# Patient Record
Sex: MALE | Race: WHITE | Employment: FULL TIME | URBAN - METROPOLITAN AREA
[De-identification: names, ages, dates, MRNs, and addresses within clinical notes are randomized per-mention and may not be internally consistent; named-entity substitution may affect disease eponyms.]

---

## 2021-12-16 ENCOUNTER — OFFICE VISIT (OUTPATIENT)
Dept: ORTHOPEDIC SURGERY | Age: 61
End: 2021-12-16
Payer: COMMERCIAL

## 2021-12-16 VITALS — HEIGHT: 73 IN | BODY MASS INDEX: 29.16 KG/M2 | WEIGHT: 220 LBS

## 2021-12-16 DIAGNOSIS — S89.92XA LEFT LEG INJURY, INITIAL ENCOUNTER: Primary | ICD-10-CM

## 2021-12-16 DIAGNOSIS — S80.12XA CONTUSION OF LEFT LEG, INITIAL ENCOUNTER: ICD-10-CM

## 2021-12-16 PROCEDURE — 99202 OFFICE O/P NEW SF 15 MIN: CPT | Performed by: ORTHOPAEDIC SURGERY

## 2021-12-16 RX ORDER — CEPHALEXIN 250 MG/1
CAPSULE ORAL
COMMUNITY
Start: 2021-12-14 | End: 2022-07-29 | Stop reason: ALTCHOICE

## 2021-12-16 RX ORDER — SULFAMETHOXAZOLE AND TRIMETHOPRIM 800; 160 MG/1; MG/1
TABLET ORAL
COMMUNITY
Start: 2021-12-14 | End: 2022-07-29 | Stop reason: ALTCHOICE

## 2022-07-29 ENCOUNTER — OFFICE VISIT (OUTPATIENT)
Dept: INTERNAL MEDICINE CLINIC | Age: 62
End: 2022-07-29
Payer: COMMERCIAL

## 2022-07-29 VITALS
TEMPERATURE: 97.8 F | RESPIRATION RATE: 16 BRPM | HEIGHT: 73 IN | OXYGEN SATURATION: 98 % | HEART RATE: 68 BPM | WEIGHT: 225 LBS | DIASTOLIC BLOOD PRESSURE: 80 MMHG | SYSTOLIC BLOOD PRESSURE: 136 MMHG | BODY MASS INDEX: 29.82 KG/M2

## 2022-07-29 DIAGNOSIS — Z11.59 NEED FOR HEPATITIS C SCREENING TEST: ICD-10-CM

## 2022-07-29 DIAGNOSIS — R00.2 PALPITATIONS: ICD-10-CM

## 2022-07-29 DIAGNOSIS — Z00.00 WELL ADULT EXAM: Primary | ICD-10-CM

## 2022-07-29 DIAGNOSIS — H91.93 BILATERAL HEARING LOSS, UNSPECIFIED HEARING LOSS TYPE: ICD-10-CM

## 2022-07-29 PROCEDURE — 99386 PREV VISIT NEW AGE 40-64: CPT | Performed by: INTERNAL MEDICINE

## 2022-07-29 NOTE — PROGRESS NOTES
ADVISED PATIENT OF THE FOLLOWING HEALTH MAINTAINCE DUE  Health Maintenance Due   Topic Date Due    Hepatitis C Screening  Never done    COVID-19 Vaccine (1) Never done    Lipid Screen  Never done    Colorectal Cancer Screening Combo  Never done    Shingrix Vaccine Age 50> (1 of 2) Never done      Chief Complaint   Patient presents with    Establish Care       1. Have you been to the ER, urgent care clinic since your last visit? Hospitalized since your last visit? No    2. Have you seen or consulted any other health care providers outside of the [address] [place] [address] since your last visit? Include any DEXA scan, mammography  or colon screening. No    3. Do you have an Advance Directive on file? no    4. Do you have a DNR on file? no    Patient is accompanied by self I have received verbal consent from Joanna Snell to discuss any/all medical information while they are present in the room. No flowsheet data found.

## 2022-07-29 NOTE — PROGRESS NOTES
HISTORY OF PRESENT ILLNESS  Conner St is a 60 y.o. male. New patient who comes in to establish care and have a physical.  I reviewed records in EMR. Vital signs are stable. Has gained some weight. BMI 29.7. No significant PMH. Reports having worsening hearing loss. Also reports occasional palpitations but no chest pain, dyspnea, heart related symptoms. No history of heart disease. NKA. No prescription medications. Denies smoking. Drinks socially. 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 He is . Reports trying to watch his diet and be active physically. No issues with COVID. Needs labs. No other acute complaints. HPI    Review of Systems   Constitutional: Negative. HENT:  Positive for hearing loss. Negative for ear discharge, ear pain and tinnitus. Eyes: Negative. Negative for blurred vision. Respiratory: Negative. Negative for shortness of breath. Cardiovascular:  Positive for palpitations. Negative for chest pain and leg swelling. Gastrointestinal: Negative. Negative for abdominal pain and heartburn. Genitourinary: Negative. Negative for dysuria. Musculoskeletal:  Positive for joint pain. Negative for falls. Skin: Negative. Neurological: Negative. Negative for dizziness, sensory change, focal weakness and headaches. Endo/Heme/Allergies: Negative. Negative for polydipsia. Psychiatric/Behavioral: Negative. Negative for depression. The patient is not nervous/anxious and does not have insomnia. Physical Exam  Vitals and nursing note reviewed. Constitutional:       General: He is not in acute distress. Appearance: He is well-developed. Comments: Pleasant overweight man   HENT:      Head: Normocephalic and atraumatic. Right Ear: Tympanic membrane normal.      Left Ear: Tympanic membrane normal.      Nose: Nose normal.      Mouth/Throat:      Mouth: Mucous membranes are moist.      Pharynx: Oropharynx is clear.    Eyes:      General: No scleral icterus. Conjunctiva/sclera: Conjunctivae normal.      Pupils: Pupils are equal, round, and reactive to light. Neck:      Thyroid: No thyromegaly. Vascular: No carotid bruit or JVD. Cardiovascular:      Rate and Rhythm: Normal rate and regular rhythm. Heart sounds: Normal heart sounds. No murmur heard. Pulmonary:      Effort: Pulmonary effort is normal. No respiratory distress. Breath sounds: Normal breath sounds. No wheezing or rales. Abdominal:      General: Bowel sounds are normal. There is no distension. Palpations: Abdomen is soft. Tenderness: There is no abdominal tenderness. There is no right CVA tenderness or left CVA tenderness. Musculoskeletal:         General: No tenderness. Cervical back: Normal range of motion and neck supple. Right lower leg: No edema. Left lower leg: No edema. Lymphadenopathy:      Cervical: No cervical adenopathy. Skin:     General: Skin is warm and dry. Findings: No erythema or rash. Neurological:      General: No focal deficit present. Mental Status: He is alert and oriented to person, place, and time. Cranial Nerves: No cranial nerve deficit. Coordination: Coordination normal.   Psychiatric:         Behavior: Behavior normal.     ASSESSMENT and PLAN  Diagnoses and all orders for this visit:    1. Well adult exam  -     LIPID PANEL; Future  -     METABOLIC PANEL, COMPREHENSIVE; Future  -     CBC W/O DIFF; Future  -     HEMOGLOBIN A1C WITH EAG; Future  -     TSH 3RD GENERATION; Future  -     VITAMIN D, 25 HYDROXY; Future  -     PSA SCREENING (SCREENING); Future    2. Bilateral hearing loss, unspecified hearing loss type  -     REFERRAL TO ENT-OTOLARYNGOLOGY    3. Need for hepatitis C screening test  -     HEPATITIS C AB; Future    4. Palpitations  -     REFERRAL TO CARDIOLOGY      Follow-up and Dispositions    Return in about 1 year (around 7/29/2023).      All chronic medical problems are stable  Continue with current medical management and plan  lab results and schedule of future lab studies reviewed with patient  reviewed diet, exercise and weight control  reviewed medications and side effects in detail  F/u with other MD's/ providers as scheduled  COVID-19 precautions discussed with pt  An After Visit Summary was printed and given to the patient.

## 2022-08-02 LAB
25(OH)D3+25(OH)D2 SERPL-MCNC: 30.5 NG/ML (ref 30–100)
ALBUMIN SERPL-MCNC: 4.4 G/DL (ref 3.8–4.8)
ALBUMIN/GLOB SERPL: 1.8 {RATIO} (ref 1.2–2.2)
ALP SERPL-CCNC: 69 IU/L (ref 44–121)
ALT SERPL-CCNC: 25 IU/L (ref 0–44)
AST SERPL-CCNC: 21 IU/L (ref 0–40)
BILIRUB SERPL-MCNC: 0.4 MG/DL (ref 0–1.2)
BUN SERPL-MCNC: 15 MG/DL (ref 8–27)
BUN/CREAT SERPL: 17 (ref 10–24)
CALCIUM SERPL-MCNC: 9.4 MG/DL (ref 8.6–10.2)
CHLORIDE SERPL-SCNC: 102 MMOL/L (ref 96–106)
CHOLEST SERPL-MCNC: 211 MG/DL (ref 100–199)
CO2 SERPL-SCNC: 23 MMOL/L (ref 20–29)
CREAT SERPL-MCNC: 0.86 MG/DL (ref 0.76–1.27)
EGFR: 97 ML/MIN/1.73
ERYTHROCYTE [DISTWIDTH] IN BLOOD BY AUTOMATED COUNT: 12.7 % (ref 11.6–15.4)
EST. AVERAGE GLUCOSE BLD GHB EST-MCNC: 100 MG/DL
GLOBULIN SER CALC-MCNC: 2.5 G/DL (ref 1.5–4.5)
GLUCOSE SERPL-MCNC: 95 MG/DL (ref 65–99)
HBA1C MFR BLD: 5.1 % (ref 4.8–5.6)
HCT VFR BLD AUTO: 47.6 % (ref 37.5–51)
HCV AB S/CO SERPL IA: <0.1 S/CO RATIO (ref 0–0.9)
HDLC SERPL-MCNC: 33 MG/DL
HGB BLD-MCNC: 16.2 G/DL (ref 13–17.7)
IMP & REVIEW OF LAB RESULTS: NORMAL
LDLC SERPL CALC-MCNC: 126 MG/DL (ref 0–99)
MCH RBC QN AUTO: 30.8 PG (ref 26.6–33)
MCHC RBC AUTO-ENTMCNC: 34 G/DL (ref 31.5–35.7)
MCV RBC AUTO: 91 FL (ref 79–97)
PLATELET # BLD AUTO: 219 X10E3/UL (ref 150–450)
POTASSIUM SERPL-SCNC: 4.1 MMOL/L (ref 3.5–5.2)
PROT SERPL-MCNC: 6.9 G/DL (ref 6–8.5)
PSA SERPL-MCNC: 3.6 NG/ML (ref 0–4)
RBC # BLD AUTO: 5.26 X10E6/UL (ref 4.14–5.8)
SODIUM SERPL-SCNC: 139 MMOL/L (ref 134–144)
TRIGL SERPL-MCNC: 292 MG/DL (ref 0–149)
TSH SERPL DL<=0.005 MIU/L-ACNC: 2.67 UIU/ML (ref 0.45–4.5)
VLDLC SERPL CALC-MCNC: 52 MG/DL (ref 5–40)
WBC # BLD AUTO: 9.7 X10E3/UL (ref 3.4–10.8)

## 2022-08-05 NOTE — PROGRESS NOTES
Cholesterol is elevated. Recommend that patient watch diet for fatty foods as well as those high in sugar and exercise regularly as tolerated.     Otherwise, stable labs

## 2022-08-28 PROBLEM — Z00.00 WELL ADULT EXAM: Status: RESOLVED | Noted: 2022-07-29 | Resolved: 2022-08-28

## 2022-09-07 ENCOUNTER — OFFICE VISIT (OUTPATIENT)
Dept: CARDIOLOGY CLINIC | Age: 62
End: 2022-09-07
Payer: COMMERCIAL

## 2022-09-07 ENCOUNTER — TELEPHONE (OUTPATIENT)
Dept: CARDIOLOGY CLINIC | Age: 62
End: 2022-09-07

## 2022-09-07 VITALS
WEIGHT: 215 LBS | BODY MASS INDEX: 28.49 KG/M2 | HEART RATE: 61 BPM | OXYGEN SATURATION: 98 % | DIASTOLIC BLOOD PRESSURE: 62 MMHG | RESPIRATION RATE: 16 BRPM | SYSTOLIC BLOOD PRESSURE: 100 MMHG | HEIGHT: 73 IN

## 2022-09-07 DIAGNOSIS — R00.2 PALPITATIONS: Primary | ICD-10-CM

## 2022-09-07 DIAGNOSIS — R00.0 TACHYCARDIA: ICD-10-CM

## 2022-09-07 PROCEDURE — 93000 ELECTROCARDIOGRAM COMPLETE: CPT | Performed by: SPECIALIST

## 2022-09-07 PROCEDURE — 99204 OFFICE O/P NEW MOD 45 MIN: CPT | Performed by: SPECIALIST

## 2022-09-07 NOTE — PATIENT INSTRUCTIONS
You have been scheduled for an echocardiogram.     A 30 day loop monitor has been ordered for you. This will be mailed to the address given to us in the next 5-7 business days. Please read over the instructions given to you about [employer] loop monitors. If you have any insurance questions regarding coverage and out of pocket cost please contact the number below. If you have any billing questions regarding deductible or responsibility call [phone]. If you need additional supplies or issue with your monitor please call [phone]. We will see you back after testing.

## 2022-09-07 NOTE — TELEPHONE ENCOUNTER
Enrolled with [employer] - Ordered and being shipped to patient's home address on file. ETA within 5-7 business days. Received: Today  MAMADOU Rosen  Can you order this young man a 27 day loop for tachy/palps?      Thanks

## 2022-09-07 NOTE — PROGRESS NOTES
Charmaine Tariq     1960       Celestine Aponte MD, [facility]  Date of Visit-09/07/2022   PCP is DO [facility] [place]  [facility]  HPI:  Charmaine Tariq is a 60 y.o. male   Claretha Mcardle comes to see us today on referral from Angelito Coronel DO for palpitations and tachycardia. About 25 years ago he would occasionally notice that he would feel a sense of rapid irregular heartbeat. It initially happened when he was in the sun and overheated. Over the last several years it slowly increased and then in the last few weeks is definitely increasing frequency almost every day . Now it has sudden onset and makes him feel a cold feeling in the throat and upper sternum. It is associated with rapid and strong heartbeats. It is now occurring more frequently at night and evenings. It became more severe about 4 weeks ago and now is more frequent occurring almost every day. He does not feel shortness of breath or syncopal with this. In general he has no chest pain or edema. He goes to the gym at [employer] 3-4 times a week for 45 minutes of cardio and 30 minutes of lifting and does not feel any limitation. He has no family history of arrhythmia or CAD other than a maternal grandmother who passed from a heart attack a long time ago. He feels no general limitation. His review of systems except for the palpitations and tachycardia is negative. He is on no medications. He has reduced alcohol , drinks 1 to 2 cups of caffeine daily , and has been exercising and losing weight intentionally. EKG:   normal EKG, normal sinus rhythm within normal limits   Assessment/Plan:   Patient Instructions   You have been scheduled for an echocardiogram.     A 30 day loop monitor has been ordered for you. This will be mailed to the address given to us in the next 5-7 business days. Please read over the instructions given to you about [employer] loop monitors. If you have any insurance questions regarding coverage and out of pocket cost please contact the number below. If you have any billing questions regarding deductible or responsibility call [phone]. If you need additional supplies or issue with your monitor please call [phone]. We will see you back after testing. Patient with tachycardia and a pounding sensation with associated some coolness in the throat area that is occurring more frequently. We went over the differential which could include ectopics ,PACs ,PVCs ,accelerated sinus tachycardias ,SVTs, and atrial fibrillation. The latter would be the most significant in terms of changing plans and therapy. Our typical work-up here would be an echocardiogram and a 30 Day Loop monitor and I went over the rationale for the testing and with the expected findings could be. He is comfortable  with plans and we both agree at this time to not start medication. He is not limited in ADLs. If we see a significant tachycardia we could consider a trial of beta-blockers. I will see him back in 60 days after the echo and 30 Day Loop are back. His prior labs were reviewed and his TSH and chemistry was normal along with his blood count. He does have a slight elevation in his cholesterol and LDL. His risk score is moderate at 9.8% I would suggest that we consider a CT calcium score but would wait until we have more data on the echo in case that would change plans --we will discuss the calcium score with him when he returns. Thank you for the consultation very nice gentleman hopefully we can get him some answers      Impression:    1. Palpitations    2.  Tachycardia       The 10-year ASCVD risk score (Sierra Byrne., et al., 2013) is: 9.8%    Values used to calculate the score:      Age: 60 years      Sex: Male      Is Non- : No      Diabetic: No      Tobacco smoker: No      Systolic Blood Pressure: 390 mmHg      Is BP treated: No      HDL Cholesterol: 33 mg/dL      Total Cholesterol: 211 mg/dL   Future Appointments   Date Time Provider Lori Saldaña   10/11/2022  8:00 AM PATRIZIA ABURTO AMB   10/20/2022 10:20 AM MD CASEY Cutler  MARTIN      Patient Care Team:  Ashwin Booker DO as PCP - General (Internal Medicine Physician)  Ashwin Booker DO as PCP - 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 Review of Systems   Constitutional:  Negative for diaphoresis, fever and malaise/fatigue. HENT:  Negative for ear pain, hearing loss, nosebleeds and tinnitus. Eyes:  Negative for blurred vision, double vision and pain. Respiratory:  Negative for cough, hemoptysis, sputum production, shortness of breath, wheezing and stridor. Cardiovascular:  Positive for palpitations. Negative for chest pain, orthopnea, claudication and leg swelling. Gastrointestinal:  Negative for abdominal pain, blood in stool, constipation, diarrhea, heartburn, nausea and vomiting. Genitourinary:  Negative for dysuria, frequency and urgency. Musculoskeletal:  Negative for back pain, falls and joint pain. Skin:  Negative for rash. Neurological:  Negative for dizziness, seizures, loss of consciousness, weakness and headaches. Endo/Heme/Allergies:  Does not bruise/bleed easily. Psychiatric/Behavioral:  Negative for depression, hallucinations and memory loss. The patient is not nervous/anxious and does not have insomnia.        Exam and Labs:  Visit Vitals  /62   Pulse 61   Resp 16   Ht 6' 1\" (1.854 m)   Wt 215 lb (97.5 kg)   SpO2 98%   BMI 28.37 kg/m²      Constitutional:  NAD, comfortable ,   Head: NC,AT. Eyes: No scleral icterus. Neck:  Neck supple. No JVD present. Throat: moist mucous membranes. Chest: Effort normal & normal respiratory excursion . Neurological: alert, conversant and oriented . Skin: Skin is not cold. No obvious systemic rash noted. Not diaphoretic. No erythema. Psychiatric:  Grossly normal mood and affect. Behavior appears normal. Extremities:  no clubbing or cyanosis. Abdomen: non distended   Lungs:breath sounds normal. No stridor. distress, wheezes or  Rales. Heart:normal rate, regular rhythm, normal S1, S2, no murmurs, rubs, clicks or gallops , PMI non displaced. Edema: Edema is none. Additional neck no bruit  Additional abdomen nontender to palpation no aortic or renal bruits no palpable mass  Lab Results   Component Value Date/Time    Cholesterol, total 211 (H) 08/01/2022 10:34 AM    HDL Cholesterol 33 (L) 08/01/2022 10:34 AM    LDL, calculated 126 (H) 08/01/2022 10:34 AM    Triglyceride 292 (H) 08/01/2022 10:34 AM     Lab Results   Component Value Date/Time    TSH 2.670 08/01/2022 10:34 AM      No results found for this or any previous visit. Lab Results   Component Value Date/Time    Sodium 139 08/01/2022 10:34 AM    Potassium 4.1 08/01/2022 10:34 AM    Chloride 102 08/01/2022 10:34 AM    CO2 23 08/01/2022 10:34 AM    Glucose 95 08/01/2022 10:34 AM    BUN 15 08/01/2022 10:34 AM    Creatinine 0.86 08/01/2022 10:34 AM    BUN/Creatinine ratio 17 08/01/2022 10:34 AM    Calcium 9.4 08/01/2022 10:34 AM      Wt Readings from Last 3 Encounters:   09/07/22 215 lb (97.5 kg)   07/29/22 225 lb (102.1 kg)   12/16/21 220 lb (99.8 kg)      BP Readings from Last 3 Encounters:   09/07/22 100/62   07/29/22 136/80        No current outpatient medications on file.      No current facility-administered medications for this visit. Impression see above.

## 2022-10-11 ENCOUNTER — ANCILLARY PROCEDURE (OUTPATIENT)
Dept: CARDIOLOGY CLINIC | Age: 62
End: 2022-10-11
Payer: COMMERCIAL

## 2022-10-11 VITALS — WEIGHT: 215 LBS | BODY MASS INDEX: 28.49 KG/M2 | HEIGHT: 73 IN

## 2022-10-11 DIAGNOSIS — R00.2 HEART PALPITATIONS: ICD-10-CM

## 2022-10-11 PROCEDURE — 93306 TTE W/DOPPLER COMPLETE: CPT | Performed by: SPECIALIST

## 2022-10-13 LAB
ECHO AO ASC DIAM: 3.2 CM
ECHO AO ASCENDING AORTA INDEX: 1.44 CM/M2
ECHO AO ROOT DIAM: 3.6 CM
ECHO AO ROOT INDEX: 1.62 CM/M2
ECHO AV AREA PEAK VELOCITY: 2.6 CM2
ECHO AV AREA VTI: 2.5 CM2
ECHO AV AREA/BSA PEAK VELOCITY: 1.2 CM2/M2
ECHO AV AREA/BSA VTI: 1.1 CM2/M2
ECHO AV MEAN GRADIENT: 3 MMHG
ECHO AV MEAN VELOCITY: 0.9 M/S
ECHO AV PEAK GRADIENT: 7 MMHG
ECHO AV PEAK VELOCITY: 1.3 M/S
ECHO AV VELOCITY RATIO: 0.77
ECHO AV VTI: 27.2 CM
ECHO LA DIAMETER INDEX: 1.53 CM/M2
ECHO LA DIAMETER: 3.4 CM
ECHO LA TO AORTIC ROOT RATIO: 0.94
ECHO LA VOL 2C: 88 ML (ref 18–58)
ECHO LA VOL 4C: 46 ML (ref 18–58)
ECHO LA VOL BP: 64 ML (ref 18–58)
ECHO LA VOL/BSA BIPLANE: 29 ML/M2 (ref 16–34)
ECHO LA VOLUME AREA LENGTH: 68 ML
ECHO LA VOLUME INDEX A2C: 40 ML/M2 (ref 16–34)
ECHO LA VOLUME INDEX A4C: 21 ML/M2 (ref 16–34)
ECHO LA VOLUME INDEX AREA LENGTH: 31 ML/M2 (ref 16–34)
ECHO LV E' LATERAL VELOCITY: 12 CM/S
ECHO LV E' SEPTAL VELOCITY: 10 CM/S
ECHO LV EDV A2C: 123 ML
ECHO LV EDV A4C: 119 ML
ECHO LV EDV BP: 124 ML (ref 67–155)
ECHO LV EDV INDEX A4C: 54 ML/M2
ECHO LV EDV INDEX BP: 56 ML/M2
ECHO LV EDV NDEX A2C: 55 ML/M2
ECHO LV EJECTION FRACTION A2C: 59 %
ECHO LV EJECTION FRACTION A4C: 47 %
ECHO LV EJECTION FRACTION BIPLANE: 53 % (ref 55–100)
ECHO LV ESV A2C: 51 ML
ECHO LV ESV A4C: 63 ML
ECHO LV ESV BP: 59 ML (ref 22–58)
ECHO LV ESV INDEX A2C: 23 ML/M2
ECHO LV ESV INDEX A4C: 28 ML/M2
ECHO LV ESV INDEX BP: 27 ML/M2
ECHO LV FRACTIONAL SHORTENING: 32 % (ref 28–44)
ECHO LV INTERNAL DIMENSION DIASTOLE INDEX: 2.12 CM/M2
ECHO LV INTERNAL DIMENSION DIASTOLIC: 4.7 CM (ref 4.2–5.9)
ECHO LV INTERNAL DIMENSION SYSTOLIC INDEX: 1.44 CM/M2
ECHO LV INTERNAL DIMENSION SYSTOLIC: 3.2 CM
ECHO LV IVSD: 1.3 CM (ref 0.6–1)
ECHO LV MASS 2D: 251.4 G (ref 88–224)
ECHO LV MASS INDEX 2D: 113.2 G/M2 (ref 49–115)
ECHO LV POSTERIOR WALL DIASTOLIC: 1.4 CM (ref 0.6–1)
ECHO LV RELATIVE WALL THICKNESS RATIO: 0.6
ECHO LVOT AREA: 3.1 CM2
ECHO LVOT AV VTI INDEX: 0.76
ECHO LVOT DIAM: 2 CM
ECHO LVOT MEAN GRADIENT: 2 MMHG
ECHO LVOT PEAK GRADIENT: 4 MMHG
ECHO LVOT PEAK VELOCITY: 1 M/S
ECHO LVOT STROKE VOLUME INDEX: 29.1 ML/M2
ECHO LVOT SV: 64.7 ML
ECHO LVOT VTI: 20.6 CM
ECHO MV A VELOCITY: 0.64 M/S
ECHO MV E DECELERATION TIME (DT): 275.8 MS
ECHO MV E VELOCITY: 0.74 M/S
ECHO MV E/A RATIO: 1.16
ECHO MV E/E' LATERAL: 6.17
ECHO MV E/E' RATIO (AVERAGED): 6.78
ECHO MV E/E' SEPTAL: 7.4
ECHO PV MAX VELOCITY: 0.8 M/S
ECHO PV PEAK GRADIENT: 2 MMHG
ECHO RA MINOR AXIS INDEX: 1.98 CM/M2
ECHO RA MINOR AXIS: 4.4 CM
ECHO RV INTERNAL DIMENSION: 4.4 CM
ECHO RV TAPSE: 2.3 CM (ref 1.7–?)

## 2022-10-21 ENCOUNTER — OFFICE VISIT (OUTPATIENT)
Dept: CARDIOLOGY CLINIC | Age: 62
End: 2022-10-21
Payer: COMMERCIAL

## 2022-10-21 VITALS
HEIGHT: 73 IN | HEART RATE: 60 BPM | RESPIRATION RATE: 18 BRPM | WEIGHT: 206 LBS | BODY MASS INDEX: 27.3 KG/M2 | OXYGEN SATURATION: 98 % | SYSTOLIC BLOOD PRESSURE: 120 MMHG | DIASTOLIC BLOOD PRESSURE: 60 MMHG

## 2022-10-21 DIAGNOSIS — R00.0 TACHYCARDIA: ICD-10-CM

## 2022-10-21 DIAGNOSIS — I51.7 LVH (LEFT VENTRICULAR HYPERTROPHY): ICD-10-CM

## 2022-10-21 DIAGNOSIS — R00.2 PALPITATIONS: Primary | ICD-10-CM

## 2022-10-21 PROCEDURE — 99214 OFFICE O/P EST MOD 30 MIN: CPT | Performed by: SPECIALIST

## 2022-10-21 NOTE — PROGRESS NOTES
HISTORY OF PRESENT ILLNESS  Lillie Gleason is a 60 y.o. male     SUMMARY:   Problem List  Date Reviewed: 10/20/2022            Codes Class Noted    Bilateral hearing loss, unspecified hearing loss type ICD-10-CM: H91.93  ICD-9-CM: 389.9  7/29/2022        Palpitations ICD-10-CM: R00.2  ICD-9-CM: 785.1  7/29/2022           No current outpatient medications on file prior to visit. No current facility-administered medications on file prior to visit. CARDIOLOGY STUDIES TO DATE:  9/22 30d event monitor, occ symptomatic pacs  10/22 mild lvh, otherwise normal echo      Chief Complaint   Patient presents with    Follow-up     HPI :  He presented with symptomatic palpitations and perhaps tachycardia and was evaluated for this last month. He had an echo and event monitor which I reviewed and summarized above. Turns out couple of the worst episodes he had he was not wearing the event monitor. He continues to exercise regularly without any worrisome symptoms. CARDIAC ROS:   negative for chest pain, dyspnea, palpitations, syncope, orthopnea, paroxysmal nocturnal dyspnea, exertional chest pressure/discomfort, claudication, lower extremity edema    Family History   Problem Relation Age of Onset    Heart Disease Maternal Grandmother        Past Medical History:   Diagnosis Date    Hearing loss        GENERAL ROS:  A comprehensive review of systems was negative except for that written in the HPI.     Visit Vitals  /60 (BP 1 Location: Right arm, BP Patient Position: Sitting, BP Cuff Size: Adult)   Pulse 60   Resp 18   Ht 6' 1\" (1.854 m)   Wt 206 lb (93.4 kg)   SpO2 98%   BMI 27.18 kg/m²       Wt Readings from Last 3 Encounters:   10/21/22 206 lb (93.4 kg)   10/11/22 215 lb (97.5 kg)   09/07/22 215 lb (97.5 kg)            BP Readings from Last 3 Encounters:   10/21/22 120/60   09/07/22 100/62   07/29/22 136/80       PHYSICAL EXAM  General appearance: alert, cooperative, no distress, appears stated age  Neurologic: Alert and oriented X 3  Neck: supple, symmetrical, trachea midline, no adenopathy, no carotid bruit, and no JVD  Lungs: clear to auscultation bilaterally  Heart: regular rate and rhythm, S1, S2 normal, no murmur, click, rub or gallop  Extremities: extremities normal, atraumatic, no cyanosis or edema    Lab Results   Component Value Date/Time    Cholesterol, total 211 (H) 08/01/2022 10:34 AM    HDL Cholesterol 33 (L) 08/01/2022 10:34 AM    LDL, calculated 126 (H) 08/01/2022 10:34 AM    Triglyceride 292 (H) 08/01/2022 10:34 AM     ASSESSMENT :      I explained to him that at least based on what we captured while he was wearing the monitor there was nothing serious or worrisome there. I advised he get a hand-held device which she can keep with him and use to see if he is having anything more significant going forward. He is concerned about his left ventricular hypertrophy and with a normal EKG I think it is unlikely he has a hypertrophic cardiomyopathy but I told him we could consider doing a cardiac MRI to further evaluate. I also recommended he get a calcium score to further risk stratify things and he is going to consider that. Finally we talked about symptoms that would prompt an urgent return visit here or a call to 911. current treatment plan is effective, no change in therapy  lab results and schedule of future lab studies reviewed with patient  reviewed diet, exercise and weight control    Encounter Diagnoses   Name Primary? Palpitations Yes    Tachycardia     LVH (left ventricular hypertrophy)      No orders of the defined types were placed in this encounter. Follow-up and Dispositions    Return in about 6 months (around 4/21/2023). Luis Ireland MD  10/21/2022  Please note that this dictation was completed with [employer], the 360T voice recognition software.   Quite often unanticipated grammatical, syntax, homophones, and other interpretive errors are inadvertently transcribed by the computer software. Please disregard these errors. Please excuse any errors that have escaped final proofreading. Thank you.

## 2022-10-21 NOTE — PATIENT INSTRUCTIONS
Please consider a  CT scan with calcium as well to consider Cardia.  Please come an see us in 6 months for a follow up with Dr. Suzanna Barrios.

## 2022-11-02 NOTE — PROGRESS NOTES
Echocardiogram is essentially normal.  There is minimal thickening (LVH) of the left ventricle that may be due to elevated blood pressures in the past.  This is not significant clinically for him. At this point the function and valves all look normal.      The patient saw Dr. Velia Tavarez while I was away and reviewed the monitor and echo at that time.   If the palpitations persist the patient may acquire home monitor like a Kardia device, keep follow up as below Future Appointments  4/25/2023  9:20 AM    Celestine Rosenberg MD [facility]

## 2022-11-14 ENCOUNTER — TRANSCRIBE ORDER (OUTPATIENT)
Dept: SCHEDULING | Age: 62
End: 2022-11-14

## 2022-11-14 DIAGNOSIS — E78.5 HYPERLIPEMIA: Primary | ICD-10-CM

## 2022-11-22 ENCOUNTER — HOSPITAL ENCOUNTER (OUTPATIENT)
Dept: CT IMAGING | Age: 62
Discharge: HOME | End: 2022-11-22
Attending: SPECIALIST
Payer: SELF-PAY

## 2022-11-22 DIAGNOSIS — E78.5 HYPERLIPEMIA: ICD-10-CM

## 2022-11-22 PROCEDURE — 75571 CT HRT W/O DYE W/CA TEST: CPT

## 2023-02-08 NOTE — PROGRESS NOTES
Mago Toledo     1960      Date of Visit-02/09/2023   PCP is Karla Cesar DO   Cardiologist:  Noel Burr MD, [address] [place] [address] Cardiology  [facility] [place]      HPI:  Mago Toledo is a 60 y.o. male   Originally seen for palpitations and tachycardia. About 25 years ago he would occasionally notice that he would feel a sense of rapid irregular heartbeat. It initially happened when he was in the sun and overheated. Over the last several years it slowly increased and then it began to occur daily with sudden onset and made him feel a cold feeling in the throat and upper sternum. Began occurring more frequently at night and in the evenings. He did not feel shortness of breath or have syncope. At his last visit he reported no chest pain or edema. He went to the gym at [facility] 3-4 times a week for 45 minutes of cardio and 30 minutes of lifting and did not feel any limitation. He has no family history of arrhythmia or CAD other than a maternal grandmother who passed from a heart attack a long time ago. He felt no general limitation. He had reduced alcohol, drank 1 to 2 cups of caffeine daily  Had been exercising and losing weight intentionally      Echo 10/22 showed normal EF of 53%, mild cLVH  30 day loop monitor 10/22: NSR, some PACs    Today. ..   He returns to the office after an ER admission for SVT at [facility], records requested two days ago and have not received them  Says it started while he was lying in bed one morning, no obvious triggers such as lack of sleep, increased alcohol or caffeine intake  It lasted for several minutes, he got very dizzy when he tried to walk to the kitchen  Episode persisted so he call 9-11, heart rate was 160 bpm  He reports being on the monitor on the way to the hospital and while at the hospital   Says after 2-3 hours heart started to settle down, vagal maneuvers were not attempted, he did not receive any medication to convert him, he was seen by cardiology who said it was SVT and advised him to follow up here  This was the worst episode he has ever had, said he was told his labs were ok   Prior to this he said his episodes of palpitations had lessened in frequency after losing 30 lbs and exercising regularly   He denies any chest pain or dyspnea with exertion  No syncope  No dizziness or lightheadedness    **After his last office visit records received from [facility] dated February 6, 2023  Patient presented to ER for palpitations and weakness with a heart rate between 150 and 170 bpm with occasional bradycardic episodes going down into the 50s, patient would get dizzy and lightheaded when heart rate was particularly high as well as when it became bradycardic, no chest pain no diaphoresis or shortness of breath blood pressure 173/93 heart rate 62 on arrival  Hemoglobin 15.5, sodium 143, potassium 3.8, BUN 15, creatinine 0.9, magnesium 1.6, troponin at bedtime 4.6, BNP 16  EKG determined to be SVT, no clear signs of atrial fib or flutter however when heart rate slowed down into the 50s he had a first-degree AV block and no evidence of WPW  He was seen by [facility] cardiology and treatment options were discussed  Ultimately patient opted for discharge with follow-up here  Emergency room notes stated he was started on Lopressor at discharge but patient reports he has not been taking Lopressor  EKGs with findings of a long RP tach approximately 160 bpm and he was taught how to do vagal maneuvers  Only 2 ECGs were provided: 8:29 AM showed sinus tachycardia, 839 showed normal sinus rhythm   Will request further ECGs and telemetry strips for review      EKG:   normal EKG, normal sinus rhythm within normal limits   Assessment/Plan:       1.   Palpitations/SVT: negative workup in 10/22, now with visit to [facility] ER with SVT  Will wait for records from [facility] to review   We discussed options for treatment including medication for supression and EP study =/- ablation  With his baseline heart rate in the 50s and 60s I informed him that I did not think he would tolerate AV krystle blocking medications for suppression  We discussed how to do vagal maneuvers and I will give him metoprolol 25mg to take as needed for further episodes  I will also refer him to EP to discuss EP study +/- ablation    2. Coronary artery calcification by calcium score   His risk score is moderate at 9.8%,  in  but with increased diet, exercise and weight loss will check fasting lipids again before advising him on starting statin therapy   He has follow up with Dr. Beto Britt in 2023        Impression:    1. Palpitations    2. LVH (left ventricular hypertrophy)    3. Coronary artery disease due to lipid rich plaque    4. Dyslipidemia    5.  SVT (supraventricular tachycardia) (HCC)       The 10-year ASCVD risk score (Derrick GASPAR, et al., 2019) is: 13.6%    Values used to calculate the score:      Age: 60 years      Sex: Male      Is Non- : No      Diabetic: No      Tobacco smoker: No      Systolic Blood Pressure: 928 mmHg      Is BP treated: No      HDL Cholesterol: 33 mg/dL      Total Cholesterol: 211 mg/dL     Future Appointments   Date Time Provider Loir Saldaña   2023  2:00 PM Linda Gomez MD [facility] AMB   2023  9:20 AM Celestine Rosenberg MD [facility] AMB      Patient Care Team:  Neelam Iglesias DO as PCP - General (Internal Medicine Physician)  Neelam Iglesias DO as PCP - [facility] Empaneled Provider  Jenaro Carroll MD as Consulting Provider (Cardiovascular Disease Physician)     Medical Hx= denies  No prior cardiac cath, blood transfusion, or GI bleeding  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 Review of Systems   Constitutional:  Negative for diaphoresis, fever and malaise/fatigue. HENT:  Negative for ear pain, hearing loss, nosebleeds and tinnitus. Eyes:  Negative for blurred vision, double vision and pain. Respiratory:  Negative for cough, hemoptysis, sputum production, shortness of breath, wheezing and stridor. Cardiovascular:  Positive for palpitations. Negative for chest pain, orthopnea, claudication and leg swelling. Gastrointestinal:  Negative for abdominal pain, blood in stool, constipation, diarrhea, heartburn, nausea and vomiting. Genitourinary:  Negative for dysuria, frequency and urgency. Musculoskeletal:  Negative for back pain, falls and joint pain. Skin:  Negative for rash. Neurological:  Negative for dizziness, seizures, loss of consciousness, weakness and headaches. Endo/Heme/Allergies:  Does not bruise/bleed easily. Psychiatric/Behavioral:  Negative for depression, hallucinations and memory loss. The patient is not nervous/anxious and does not have insomnia. Exam and Labs:  Visit Vitals  /80 (BP 1 Location: Left arm, BP Patient Position: Sitting, BP Cuff Size: Adult)   Pulse 66   Resp 15   Ht 6' 1\" (1.854 m)   Wt 205 lb 12.8 oz (93.4 kg)   SpO2 98%   BMI 27.15 kg/m²      Constitutional:  NAD, comfortable ,   Head: NC,AT. Eyes: No scleral icterus. Neck:  Neck supple. No JVD present. No bruits. Throat: moist mucous membranes. Chest: Effort normal & normal respiratory excursion . Neurological: alert, conversant and oriented . Skin: Skin is not cold. No obvious systemic rash noted. Not diaphoretic. No erythema. Psychiatric:  Grossly normal mood and affect. Behavior appears normal. Extremities:  no clubbing or cyanosis. Abdomen: non distended nontender to palpation no aortic or renal bruits no palpable mass  Lungs:breath sounds normal. No stridor. distress, wheezes or rales . Heart:normal rate, regular rhythm, normal S1, S2, no murmurs, rubs, clicks or gallops , PMI non displaced. Edema: Edema is none    12 lead ECG: NSR    Lab Results   Component Value Date/Time    Cholesterol, total 211 (H) 08/01/2022 10:34 AM    HDL Cholesterol 33 (L) 08/01/2022 10:34 AM    LDL, calculated 126 (H) 08/01/2022 10:34 AM    Triglyceride 292 (H) 08/01/2022 10:34 AM     Lab Results   Component Value Date/Time    TSH 2.670 08/01/2022 10:34 AM      No results found for this or any previous visit. Lab Results   Component Value Date/Time    Sodium 139 08/01/2022 10:34 AM    Potassium 4.1 08/01/2022 10:34 AM    Chloride 102 08/01/2022 10:34 AM    CO2 23 08/01/2022 10:34 AM    Glucose 95 08/01/2022 10:34 AM    BUN 15 08/01/2022 10:34 AM    Creatinine 0.86 08/01/2022 10:34 AM    BUN/Creatinine ratio 17 08/01/2022 10:34 AM    Calcium 9.4 08/01/2022 10:34 AM      Wt Readings from Last 3 Encounters:   02/09/23 205 lb 12.8 oz (93.4 kg)   10/21/22 206 lb (93.4 kg)   10/11/22 215 lb (97.5 kg)      BP Readings from Last 3 Encounters:   02/09/23 122/80   10/21/22 120/60   09/07/22 100/62        Current Outpatient Medications   Medication Sig    metoprolol tartrate (LOPRESSOR) 25 mg tablet Take 1 Tablet by mouth as needed (SVT/palpitations). No current facility-administered medications for this visit.      HERMES Beckett, [facility] Cardiology   [address] , [address] [address],[address]  [address]  () [phone] ([facility]) [phone]

## 2023-02-09 ENCOUNTER — TELEPHONE (OUTPATIENT)
Dept: CARDIOLOGY CLINIC | Age: 63
End: 2023-02-09

## 2023-02-09 ENCOUNTER — OFFICE VISIT (OUTPATIENT)
Dept: CARDIOLOGY CLINIC | Age: 63
End: 2023-02-09
Payer: COMMERCIAL

## 2023-02-09 VITALS
OXYGEN SATURATION: 98 % | HEART RATE: 66 BPM | BODY MASS INDEX: 27.28 KG/M2 | DIASTOLIC BLOOD PRESSURE: 80 MMHG | RESPIRATION RATE: 15 BRPM | SYSTOLIC BLOOD PRESSURE: 122 MMHG | WEIGHT: 205.8 LBS | HEIGHT: 73 IN

## 2023-02-09 DIAGNOSIS — I47.1 SVT (SUPRAVENTRICULAR TACHYCARDIA) (HCC): ICD-10-CM

## 2023-02-09 DIAGNOSIS — I25.10 CORONARY ARTERY DISEASE DUE TO LIPID RICH PLAQUE: ICD-10-CM

## 2023-02-09 DIAGNOSIS — E78.5 DYSLIPIDEMIA: ICD-10-CM

## 2023-02-09 DIAGNOSIS — R00.2 PALPITATIONS: Primary | ICD-10-CM

## 2023-02-09 DIAGNOSIS — I25.83 CORONARY ARTERY DISEASE DUE TO LIPID RICH PLAQUE: ICD-10-CM

## 2023-02-09 DIAGNOSIS — E83.42 HYPOMAGNESEMIA: Primary | ICD-10-CM

## 2023-02-09 RX ORDER — METOPROLOL TARTRATE 25 MG/1
25 TABLET, FILM COATED ORAL AS NEEDED
Qty: 10 TABLET | Refills: 0 | Status: SHIPPED | OUTPATIENT
Start: 2023-02-09

## 2023-02-09 RX ORDER — LANOLIN ALCOHOL/MO/W.PET/CERES
400 CREAM (GRAM) TOPICAL DAILY
Qty: 30 TABLET | Refills: 2 | Status: SHIPPED | OUTPATIENT
Start: 2023-02-09

## 2023-02-09 NOTE — TELEPHONE ENCOUNTER
Please request all ECGs and telemetry tracings from [facility] to review from 2/6/23 ER admission

## 2023-02-09 NOTE — TELEPHONE ENCOUNTER
Please call and let patient know      I received his records but will request all of his ECGs and telemetry strips for review  His magnesium level was low on his labs (1.6.)  Should be 2.0 or above to help prevent SVT. Please ask him to begin taking Mg Oxide 400mg daily and then have his Mg level checked again in 3 weeks along with his fasting lipids. TSH was normal in 8/22. I sent an Rx for metoprolol tartrate 25mg to his pharmacy to take ONLY AS NEEDED for an episode of SVT/severe palpitations. Spoke with patient. 2 patient identifiers used. Went over instructions with patient from NP. Patient verbalized understanding and did not have any questions.

## 2023-02-09 NOTE — TELEPHONE ENCOUNTER
Please call and let patient know     I received his records but will request all of his ECGs and telemetry strips for review  His magnesium level was low on his labs (1.6.)  Should be 2.0 or above to help prevent SVT. Please ask him to begin taking Mg Oxide 400mg daily and then have his Mg level checked again in 3 weeks along with his fasting lipids. TSH was normal in 8/22. I sent an Rx for metoprolol tartrate 25mg to his pharmacy to take ONLY AS NEEDED for an episode of SVT/severe palpitations.

## 2023-02-09 NOTE — PATIENT INSTRUCTIONS
Please have fasting labs drawn at the [facility] we let you know we have reviewed your records from [facility]

## 2023-02-16 ENCOUNTER — TELEPHONE (OUTPATIENT)
Dept: CARDIOLOGY CLINIC | Age: 63
End: 2023-02-16

## 2023-02-16 DIAGNOSIS — I47.1 SVT (SUPRAVENTRICULAR TACHYCARDIA) (HCC): Primary | ICD-10-CM

## 2023-02-16 NOTE — TELEPHONE ENCOUNTER
Please let patient know that we only received one ECG from his ER visit to [facility] after the second request.  I showed it to Dr. Krista Mansfield because I didn't think showed enough of his arrhythmia to be helpful and he agreed. He would like him to wear a 30 day event monitor to see if we can catch another episode prior to his visit in April. If he is agreeable please order monitor for him under Dr. Cassandra Tyson.  Thanks!     Future Appointments   Date Time Provider [facility]   4/11/2023  2:00 PM Linda Gomez MD [facility] AMB   4/25/2023  9:20 AM Celestine Rosenberg MD [facility] AMB

## 2023-02-21 ENCOUNTER — TELEPHONE (OUTPATIENT)
Dept: CARDIOLOGY CLINIC | Age: 63
End: 2023-02-21

## 2023-02-21 NOTE — TELEPHONE ENCOUNTER
Enrolled with [employer] - Ordered and being shipped to patient's home address on file. ETA within 5-7 business days. Message  Received: Vickie Shah, MAMADOU Hubbard  Can you order this young man a 27 day loop for palps and SVT?  Thank you

## 2023-03-10 ENCOUNTER — TELEPHONE (OUTPATIENT)
Dept: CARDIOLOGY CLINIC | Age: 63
End: 2023-03-10

## 2023-03-10 DIAGNOSIS — I47.1 SVT (SUPRAVENTRICULAR TACHYCARDIA) (HCC): Primary | ICD-10-CM

## 2023-03-10 DIAGNOSIS — R00.2 PALPITATIONS: ICD-10-CM

## 2023-03-10 RX ORDER — ASPIRIN 81 MG/1
81 TABLET ORAL DAILY
COMMUNITY
Start: 2023-03-10

## 2023-03-10 NOTE — TELEPHONE ENCOUNTER
Verified patient with two types of identifiers. Let Mr. López Hammond know I had Dr. Jayme Nazario review his monitor thus far as Dr. Wilian Hooks is out of the office. Dr. Jayme Nazario recommends Mr. López Hammond start baby Asprin 81mg daily, schedule a stress echo, see Dr. Wilian Hooks next week, EP appointment moved up as well and to not ride his bike or do other strenuous activity. Mr. López Hammond was very appreciative.      Future Appointments   Date Time Provider Lori Saldaña   3/14/2023 11:40 AM MD melissa Wells BS AMB   3/28/2023 11:20 AM Linda Gomez MD [facility] AMB   4/25/2023  9:20 AM Celestine Rosenberg MD [facility] AMB

## 2023-03-14 ENCOUNTER — OFFICE VISIT (OUTPATIENT)
Facility: CLINIC | Age: 63
End: 2023-03-14

## 2023-03-14 VITALS
OXYGEN SATURATION: 98 % | SYSTOLIC BLOOD PRESSURE: 110 MMHG | BODY MASS INDEX: 26.77 KG/M2 | RESPIRATION RATE: 16 BRPM | WEIGHT: 202 LBS | HEART RATE: 58 BPM | HEIGHT: 73 IN | DIASTOLIC BLOOD PRESSURE: 62 MMHG

## 2023-03-14 DIAGNOSIS — I45.5 SINUS PAUSE: ICD-10-CM

## 2023-03-14 DIAGNOSIS — I51.7 LVH (LEFT VENTRICULAR HYPERTROPHY): ICD-10-CM

## 2023-03-14 DIAGNOSIS — I47.1 SVT (SUPRAVENTRICULAR TACHYCARDIA) (HCC): ICD-10-CM

## 2023-03-14 DIAGNOSIS — I48.0 PAF (PAROXYSMAL ATRIAL FIBRILLATION) (HCC): Primary | ICD-10-CM

## 2023-03-14 NOTE — PROGRESS NOTES
Luis Fernando Haley     1960       Celestine Womack MD, [facility]  Date of Visit-03/14/2023   PCP is DO Hamida [facility] [place]  [facility]  HPI:  Luis Fernando Haley is a 58 y.o. male   6 week follow up from NP Cards OV  Today he notes the cold feeling in his chest with dizziness. He will feel the SVT when the ER for 3 hours of SVT. Some tachycardia sent. Palpitations are decreased blood pressures dropped at times. Monitor shows on 3/8/2023 he had a heart rate of 40 with a prolonged pause of 3.3 seconds. On 3/9/2023 he had atrial flutter with variable conduction and PVCs. This did not change to atrial fibrillation. Seen in September on referral from PCP for palpitations and tachycardia. He noticed an occasional tachycardia for about 25 years specially was in the sun had sudden onset 1 week ago cold feeling in the throat and upper sternum. When seen it was occurring almost every day was not limiting with exertional exercise. An echo and monitor were ordered echo showed minimal LVH and he reviewed his monitor with the visit with Dr. Alden Casillas on 10/21/2022   Then on 2/8/2023 sent a message indicating he again had SVT and had to go to [facility]    Nurse practitioner Nereida on 2/9/2023 records reviewed after the visit showing a heart rate of 170 then bradycardia to 50, BNP was 16, troponin 4 ,patient was offered Lopressor. He had an EKG with long RP tachycardia placed on as needed metoprolol due to low heart rate. His calcium score was also reviewed was advised to check lipids with 1.6 was started on magnesium headaches blood pressure went to 157 was ordered he had an LDL of 129 was recommended start statin  Echocardiogram is essentially normal.  There is minimal thickening (LVH) of the left ventricle that may be due to elevated blood pressures in the past.  This is not significant clinically for him.   At this point the function and valves all look normal.    The patient saw Dr. Ross Antoine while I was away and reviewed the monitor and echo at that time. If the palpitations persist the patient may acquire home monitor like a [employer] device  10/11/22ECHO ADULT COMPLETE 10/13/2022 10/13/2022    Interpretation Summary    Left Ventricle: Normal left ventricular systolic function. EF by 2D Simpsons Biplane is 53%. Left ventricle size is normal. Increased wall thickness. Findings consistent with mild concentric hypertrophy. Normal wall motion. Normal diastolic function. Mild LVH , otherwise normal    Signed by: Mali Pemberton MD on 10/13/2022  1:52 PM       He goes to the gym at [facility] 3-4 times a week for 45 minutes of cardio and 30 minutes of lifting and does not feel any limitation. He has no family history of arrhythmia or CAD other than a maternal grandmother who passed from a heart attack a long time ago. He has reduced alcohol , drinks 1 to 2 cups of caffeine daily , and has been exercising and losing weight intentionally. EKG:   normal EKG, normal sinus rhythm within normal limits   Assessment/Plan:   Patient Instructions   You have been scheduled for a stress echocardiogram. Please arrive 15 minutes prior to your appointment. Wear comfortable clothes and shoes. Please do not eat or drink anything other than water two hours prior to test.     We will see you back in July. The primary encounter diagnosis was PAF (paroxysmal atrial fibrillation) ([address] Diagnoses of Sinus pause, LVH (left ventricular hypertrophy), and SVT (supraventricular tachycardia) ([address] were also pertinent to this visit. Atrial flutter and fibrillation with SVT. Also had pause. Will need to see EP for evaluation tachybradycardia syndrome. Also like to exclude ischemia given the above symptoms and the stress echo has appointment with EP next week. Continue metoprolol his [id number] score is low, not yet start anticoagulation.   His CV risk is about 9% as below      Impression:    1. PAF (paroxysmal atrial fibrillation) ([address]    2. Sinus pause    3. LVH (left ventricular hypertrophy)    4. SVT (supraventricular tachycardia) (HCC)         The 10-year ASCVD risk score (Derrick GASPAR, et al., 2019) is: 9%    Values used to calculate the score:      Age: 58 years      Sex: Male      Is Non- : No      Diabetic: No      Tobacco smoker: No      Systolic Blood Pressure: 480 mmHg      Is BP treated: No      HDL Cholesterol: 50 MG/DL      Total Cholesterol: 196 MG/DL   Future Appointments   Date Time Provider Lori Saldaña   3/28/2023 11:20 AM Linda Gomez MD [facility] AMB   2023  3:00 PM PATRIZIA IGLESIAS BS AMB   2023  9:20 AM MD CASEY Shane BS AMB      Patient Care Team:  Chyrl Never, DO as PCP - General (Internal Medicine Physician)  Chyrl Never, DO as PCP - [facility]  Minerva Jaramillo MD as Consulting Provider (Cardiovascular Disease Physician)     Medical Hx= denies  No prior cardiac cath, blood transfusion, or GI bleeding  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 Review of Systems   Constitutional:  Negative for fever and weight loss. Respiratory:  Negative for shortness of breath. Cardiovascular:  Positive for palpitations. Negative for chest pain and leg swelling. Negative for claudication   Gastrointestinal:  Negative for abdominal pain, blood in stool and nausea. Genitourinary:  Negative for hematuria. Musculoskeletal:  Negative for joint pain. Neurological:  Positive for dizziness.       Exam and Labs:  Visit Vitals  /62   Pulse (!) 58   Resp 16 Ht 6' 1\" (1.854 m)   Wt 202 lb (91.6 kg)   SpO2 98%   BMI 26.65 kg/m²        Constitutional:  NAD, comfortable ,   Head: NC,AT. Eyes: No scleral icterus. Neck:  Neck supple. No JVD present. Throat: moist mucous membranes. Chest: Effort normal & normal respiratory excursion . Neurological: alert, conversant and oriented . Skin: Skin is not cold. No obvious systemic rash noted. Not diaphoretic. No erythema. Psychiatric:  Grossly normal mood and affect. Behavior appears normal. Extremities:  no clubbing or cyanosis. Abdomen: non distended   Lungs:breath sounds normal. No stridor. distress, wheezes or  Rales. Heart:normal rate, regular rhythm, normal S1, S2, no murmurs, rubs, clicks or gallops , PMI non displaced. Edema: Edema is none. Additional neck no bruit  Additional abdomen nontender to palpation no aortic or renal bruits no palpable mass  Lab Results   Component Value Date/Time    Cholesterol, total 196 03/10/2023 09:58 AM    HDL Cholesterol 50 03/10/2023 09:58 AM    LDL, calculated 129.8 (H) 03/10/2023 09:58 AM    Triglyceride 81 03/10/2023 09:58 AM    CHOL/HDL Ratio 3.9 03/10/2023 09:58 AM     Lab Results   Component Value Date/Time    TSH 2.670 08/01/2022 10:34 AM      No results found for this or any previous visit.    Lab Results   Component Value Date/Time    Sodium 139 08/01/2022 10:34 AM    Potassium 4.1 08/01/2022 10:34 AM    Chloride 102 08/01/2022 10:34 AM    CO2 23 08/01/2022 10:34 AM    Glucose 95 08/01/2022 10:34 AM    BUN 15 08/01/2022 10:34 AM    Creatinine 0.86 08/01/2022 10:34 AM    BUN/Creatinine ratio 17 08/01/2022 10:34 AM    Calcium 9.4 08/01/2022 10:34 AM      Wt Readings from Last 3 Encounters:   03/14/23 202 lb (91.6 kg)   02/09/23 205 lb 12.8 oz (93.4 kg)   10/21/22 206 lb (93.4 kg)      BP Readings from Last 3 Encounters:   03/14/23 110/62   02/09/23 122/80   10/21/22 120/60        Current Outpatient Medications   Medication Sig    aspirin delayed-release 81 mg tablet Take 1 Tablet by mouth daily. metoprolol tartrate (LOPRESSOR) 25 mg tablet Take 1 Tablet by mouth as needed (SVT/palpitations). magnesium oxide (MAG-OX) 400 mg tablet Take 1 Tablet by mouth daily. No current facility-administered medications for this visit. Impression see above.

## 2023-03-14 NOTE — LETTER
3/30/2023    Patient: Kisha David   YOB: 1960   Date of Visit: 3/14/2023     Gaurav Pride DO  [address]  [address] [facility] [address] [address]  [place]    Dear Gaurav Pride DO,      Thank you for referring Mr. Kisha David to [address] [place] [address] for evaluation. My notes for this consultation are attached. If you have questions, please do not hesitate to call me. I look forward to following your patient along with you.       Sincerely,    Liss Trinidad MD

## 2023-03-14 NOTE — PATIENT INSTRUCTIONS
You have been scheduled for a stress echocardiogram. Please arrive 15 minutes prior to your appointment. Wear comfortable clothes and shoes. Please do not eat or drink anything other than water two hours prior to test.     We will see you back in July.

## 2023-03-22 ENCOUNTER — TELEPHONE (OUTPATIENT)
Dept: CARDIOLOGY CLINIC | Age: 63
End: 2023-03-22

## 2023-03-22 NOTE — TELEPHONE ENCOUNTER
Appears that he is having episodes of Afib with RVR on his monitor   [id number] so no OAC is indicated at this time  To be discussed further with EP at appointment next week

## 2023-03-28 ENCOUNTER — OFFICE VISIT (OUTPATIENT)
Dept: CARDIOLOGY CLINIC | Age: 63
End: 2023-03-28
Payer: COMMERCIAL

## 2023-03-28 VITALS
DIASTOLIC BLOOD PRESSURE: 80 MMHG | HEIGHT: 73 IN | HEART RATE: 57 BPM | BODY MASS INDEX: 27.28 KG/M2 | RESPIRATION RATE: 18 BRPM | SYSTOLIC BLOOD PRESSURE: 120 MMHG | WEIGHT: 205.8 LBS | OXYGEN SATURATION: 98 %

## 2023-03-28 DIAGNOSIS — I47.1 SVT (SUPRAVENTRICULAR TACHYCARDIA) (HCC): ICD-10-CM

## 2023-03-28 DIAGNOSIS — I48.0 PAROXYSMAL ATRIAL FIBRILLATION (HCC): Primary | ICD-10-CM

## 2023-03-28 DIAGNOSIS — I51.7 LVH (LEFT VENTRICULAR HYPERTROPHY): ICD-10-CM

## 2023-03-28 DIAGNOSIS — E83.42 HYPOMAGNESEMIA: ICD-10-CM

## 2023-03-28 PROCEDURE — 99205 OFFICE O/P NEW HI 60 MIN: CPT | Performed by: INTERNAL MEDICINE

## 2023-03-28 PROCEDURE — 93000 ELECTROCARDIOGRAM COMPLETE: CPT | Performed by: INTERNAL MEDICINE

## 2023-03-28 NOTE — PROGRESS NOTES
Cardiac Electrophysiology OFFICE Consultation Note     Primary Cardiologist:    Assessment/Plan:   1. Paroxysmal atrial fibrillation (HCC)  -     CBC W/O DIFF; Future  -     METABOLIC PANEL, COMPREHENSIVE; Future  -     MAGNESIUM; Future  2. SVT (supraventricular tachycardia) (HCC)  -     AMB POC EKG ROUTINE W/ 12 LEADS, INTER & REP  -     AMB POC EKG ROUTINE W/ 12 LEADS, INTER & REP  -     CBC W/O DIFF; Future  -     METABOLIC PANEL, COMPREHENSIVE; Future  -     MAGNESIUM; Future  3. Hypomagnesemia  -     AMB POC EKG ROUTINE W/ 12 LEADS, INTER & REP  -     CBC W/O DIFF; Future  -     METABOLIC PANEL, COMPREHENSIVE; Future  -     MAGNESIUM; Future  4. LVH (left ventricular hypertrophy)  -     AMB POC EKG ROUTINE W/ 12 LEADS, INTER & REP  -     CBC W/O DIFF; Future  -     METABOLIC PANEL, COMPREHENSIVE; Future  -     MAGNESIUM; Future     Paroxysmal atrial fibrillation  Mr Elliott Dominique is a 59 yo male with symptomatic tachcyardia (svt, af/afl) with resting HR in th 46s. His CHADSVASC is 0. He is a candidate for AF AFL ablation. Significantly symptomatic, increased burden and duration. Limiting his ability to exercise. Baseline bradycardia limits long-term antiarrhythmic therapy. - The implication regarding the diagnosis of AF was explained to the patient in great detail including the associated risk of CVA, AF-mediated cardiomyopathy, and progression into persistent/chronic AF.  - Literature from the EAST-AFNET 4 Trial demonstrated that early rhythm control management in patients with early diagnosis of atrial fibrillation (median time of diagnosis, 36 days) resulted in reduction in cardiovascular mortality, stroke, or hospitalization with worsening heart failure or ACS. - I have discussed options including continued medical therapy and ablation in detail.  I have discussed the risks of left atrial ablation including vascular complications, infection, MI, death, stroke, cardiac tamponade, esophageal fistula, phrenic nerve paralysis, PV stenosis and need for a 2nd procedure with the pt. Patient reports complete understanding of discussions and recommendations and wishes to proceed with the procedure.    -Will start Xarelto 20 mg daily with meals. He will take [facility] 1 mo prior and three months post ablation.   -Because he is so symptomatic we will trial Multaq 400 mg twice daily for rhythm control as a bridge to ablation  -labs prior (cbc, cmp, mg)  -Continue Xarelto uninterrupted    High risk med management  - We discussed in great detail regarding high risk medication management and the associated risks of antiarrhythmic therapy. Patient reports full understanding of recommendations. - Patient is being monitored for high risk drug monitoring for side effects and toxicity. - EKG demonstrated QTc of 414 ms    Anticoagulation  The options regarding new oral anticoagulation medications (Xarelto, Pradaxa, Eliquis, Savaysa) vs. Coumadin were explained to the patient in details. The risks and side effects of the medications were explained, including but not limited to the risk of bleeding such as BRBPR, melena, hemoptysis, or hematuria. Patient reports full understanding of recommendations. Subjective:       Candice Montes is a 58 y.o. patient who is seen for evaluation of  SVT, AF/AFL. He remains symptomatic with elevated HR and asymptomatic bradycardia. Recent event in atrial fib, flutter and SVT. HR around 150-170 bpm.    Unable to exercise due to limiting tachycardia lasting 5-6 hours. Associated sx of weakness with near syncope. Prior to these episodes 4-6 hours a week with activity - lifting, biking, gym. I independently review internal and external records of most recent labs, EKGs, event monitors or Holters, and imaging including most recent echocardiograms and stress test.     Coronary Calcium Score of 4. Lipids improving overall, with LDL goal <100. ASCVD risk 8.1%.      Patient Active Problem List Diagnosis Code    Bilateral hearing loss, unspecified hearing loss type H91.93    Palpitations R00.2     Current Outpatient Medications   Medication Sig Dispense Refill    rivaroxaban (Xarelto) 20 mg tab tablet Take 1 Tablet by mouth daily (with breakfast). 30 Tablet 4    aspirin delayed-release 81 mg tablet Take 1 Tablet by mouth daily. metoprolol tartrate (LOPRESSOR) 25 mg tablet Take 1 Tablet by mouth as needed (SVT/palpitations). 10 Tablet 0    magnesium oxide (MAG-OX) 400 mg tablet Take 1 Tablet by mouth daily. 30 Tablet 2     No Known Allergies  Past Medical History:   Diagnosis Date    Hearing loss      History reviewed. No pertinent surgical history. Family History   Problem Relation Age of Onset    Heart Disease Maternal Grandmother      Social History     Tobacco Use    Smoking status: Never    Smokeless tobacco: Never   Substance Use Topics    Alcohol use: Not Currently        Review of Systems:   12 point review of systems was performed.  All negative except for HPI     Objective:   /80 (BP 1 Location: Left upper arm, BP Patient Position: Sitting, BP Cuff Size: Adult)   Pulse (!) 57   Resp 18   Ht 6' 1\" (1.854 m)   Wt 205 lb 12.8 oz (93.4 kg)   SpO2 98%   BMI 27.15 kg/m²     Physical Exam:   General:  Alert and oriented, in no acute distress  Head:  Atraumatic, normocephalic  Eyes:  extraocular muscles intact  Neck:  Supple, normal range of motion  Lungs:  Clear to auscultation bilaterally, no wheezes/rales/rhonchi   Cardiovascular:  Regular rate and rhythm, normal S1-S2, no murmurs/rubs/gallops  Abdomen:  Soft, nontender, nondistended, normoactive bowel sounds  Skin:  Intact, no rash  Extremities:, no clubbing, cyanosis, or edema  Musculoskeletal: normal range of motion  Neurological:  Alert and oriented, no focal neurologic deficits  Psychiatric:  Normal mood and affect    Lab Results   Component Value Date/Time    Hemoglobin A1c 5.1 08/01/2022 10:34 AM     EKG: Sinus rhythm, rate 65 bpm, normal axis, QTc 414 ms  10/11/22    ECHO ADULT COMPLETE 10/13/2022 10/13/2022    Interpretation Summary    Left Ventricle: Normal left ventricular systolic function. EF by 2D Simpsons Biplane is 53%. Left ventricle size is normal. Increased wall thickness. Findings consistent with mild concentric hypertrophy. Normal wall motion. Normal diastolic function. Mild LVH , otherwise normal    Signed by: Ventura Miller MD on 10/13/2022  1:52 PM        No results found for this or any previous visit. Thank you for involving me in this patient's care and please call with further concerns or questions. ________________________________________  An Lucretia Magdaleno MD, [facility], [facility]  Cardiac Electrophysiology  [facility] [place]  [address] [place] [address]                             [phone]     [address] [place] [address].  [address] [place] [address], [id number] [facility]  [phone]

## 2023-03-28 NOTE — PATIENT INSTRUCTIONS
Schedule for Afib ablation next available  Start Xarelto 20 mg with meals daily 1 month prior to ablation. Stop Aspirin 81 mg when you start Xarelto. Start Multaq 400 mg twice a day as a bridge to ablation (do not need to take Metoprolol while on Multaq)      Your EP Study and Atrial Fibrillation Ablation procedure has been scheduled for 4/26/23 at 12:00 pm, at [facility].    Please report to Admitting Department by 10:00 am, or 2 hours prior to your scheduled procedure. Please bring a list of your current medications and medication bottles, if able, to the hospital on this day. You will be unable to drive after your procedure so please make sure to bring someone with you to your procedure. You will need to have nothing to eat or drink after midnight, the night prior to your procedure. You may have small sips of water, if needed, to take with your medication. You will need labs drawn prior to your procedure. Please go to have this done today. You should NOT stop your medication prior to your scheduled procedure. After your procedure, you will need to follow up with Dr. Mandie Durán.  Your follow-up appointment has been scheduled for 5/25/23 at 3:20 pm.

## 2023-04-01 ENCOUNTER — DOCUMENTATION ONLY (OUTPATIENT)
Dept: CARDIOLOGY CLINIC | Age: 63
End: 2023-04-01

## 2023-04-07 ENCOUNTER — TELEPHONE (OUTPATIENT)
Dept: CARDIOLOGY CLINIC | Age: 63
End: 2023-04-07

## 2023-04-19 RX ORDER — SODIUM CHLORIDE 0.9 % (FLUSH) 0.9 %
5-40 SYRINGE (ML) INJECTION EVERY 8 HOURS
OUTPATIENT
Start: 2023-04-19

## 2023-04-19 RX ORDER — SODIUM CHLORIDE 0.9 % (FLUSH) 0.9 %
5-40 SYRINGE (ML) INJECTION AS NEEDED
OUTPATIENT
Start: 2023-04-19

## 2023-04-26 ENCOUNTER — ANESTHESIA (OUTPATIENT)
Dept: CARDIAC CATH/INVASIVE PROCEDURES | Age: 63
End: 2023-04-26
Payer: COMMERCIAL

## 2023-04-26 ENCOUNTER — ANESTHESIA EVENT (OUTPATIENT)
Dept: CARDIAC CATH/INVASIVE PROCEDURES | Age: 63
End: 2023-04-26
Payer: COMMERCIAL

## 2023-04-26 ENCOUNTER — HOSPITAL ENCOUNTER (OUTPATIENT)
Age: 63
Setting detail: OUTPATIENT SURGERY
Discharge: HOME | End: 2023-04-26
Attending: INTERNAL MEDICINE | Admitting: INTERNAL MEDICINE
Payer: COMMERCIAL

## 2023-04-26 VITALS
OXYGEN SATURATION: 100 % | RESPIRATION RATE: 15 BRPM | HEIGHT: 73 IN | TEMPERATURE: 97.6 F | SYSTOLIC BLOOD PRESSURE: 110 MMHG | WEIGHT: 200 LBS | HEART RATE: 69 BPM | DIASTOLIC BLOOD PRESSURE: 80 MMHG | BODY MASS INDEX: 26.51 KG/M2

## 2023-04-26 DIAGNOSIS — I48.91 ATRIAL FIBRILLATION, UNSPECIFIED TYPE (HCC): ICD-10-CM

## 2023-04-26 LAB
ABO + RH BLD: NORMAL
ACT BLD: 335 SECS (ref 79–138)
ACT BLD: 414 SECS (ref 79–138)
ACT BLD: 426 SECS (ref 79–138)
BLOOD GROUP ANTIBODIES SERPL: NORMAL
SPECIMEN EXP DATE BLD: NORMAL

## 2023-04-26 PROCEDURE — 93662 INTRACARDIAC ECG (ICE): CPT | Performed by: INTERNAL MEDICINE

## 2023-04-26 PROCEDURE — 93657 TX L/R ATRIAL FIB ADDL: CPT | Performed by: INTERNAL MEDICINE

## 2023-04-26 PROCEDURE — 93656 COMPRE EP EVAL ABLTJ ATR FIB: CPT | Performed by: INTERNAL MEDICINE

## 2023-04-26 PROCEDURE — 2709999900 HC NON-CHARGEABLE SUPPLY: Performed by: INTERNAL MEDICINE

## 2023-04-26 PROCEDURE — 77030013797 HC KT TRNSDUC PRSSR EDWD -A: Performed by: INTERNAL MEDICINE

## 2023-04-26 PROCEDURE — C1894 INTRO/SHEATH, NON-LASER: HCPCS | Performed by: INTERNAL MEDICINE

## 2023-04-26 PROCEDURE — 93613 INTRACARDIAC EPHYS 3D MAPG: CPT | Performed by: INTERNAL MEDICINE

## 2023-04-26 PROCEDURE — 77030020506 HC NDL TRNSPTL NRG BAYL -F: Performed by: INTERNAL MEDICINE

## 2023-04-26 PROCEDURE — C1760 CLOSURE DEV, VASC: HCPCS | Performed by: INTERNAL MEDICINE

## 2023-04-26 PROCEDURE — 86900 BLOOD TYPING SEROLOGIC ABO: CPT

## 2023-04-26 PROCEDURE — 93005 ELECTROCARDIOGRAM TRACING: CPT

## 2023-04-26 PROCEDURE — 74011000250 HC RX REV CODE- 250: Performed by: NURSE ANESTHETIST, CERTIFIED REGISTERED

## 2023-04-26 PROCEDURE — 74011250636 HC RX REV CODE- 250/636: Performed by: NURSE ANESTHETIST, CERTIFIED REGISTERED

## 2023-04-26 PROCEDURE — 77030041243 HC CBL DIAG SNSR J&J -D: Performed by: INTERNAL MEDICINE

## 2023-04-26 PROCEDURE — 93622 COMP EP EVAL L VENTR PAC&REC: CPT | Performed by: INTERNAL MEDICINE

## 2023-04-26 PROCEDURE — 77030027107 HC PTCH EXT REF CARTO3 J&J -F: Performed by: INTERNAL MEDICINE

## 2023-04-26 PROCEDURE — 85347 COAGULATION TIME ACTIVATED: CPT

## 2023-04-26 PROCEDURE — 93609 INTRA-VNTR MAPG TCHYCAR SITE: CPT | Performed by: INTERNAL MEDICINE

## 2023-04-26 PROCEDURE — 76937 US GUIDE VASCULAR ACCESS: CPT | Performed by: INTERNAL MEDICINE

## 2023-04-26 PROCEDURE — 77030035291 HC TBNG PMP SMARTABLATE J&J -B: Performed by: INTERNAL MEDICINE

## 2023-04-26 PROCEDURE — 93655 ICAR CATH ABLTJ DSCRT ARRHYT: CPT | Performed by: INTERNAL MEDICINE

## 2023-04-26 PROCEDURE — C1733 CATH, EP, OTHR THAN COOL-TIP: HCPCS | Performed by: INTERNAL MEDICINE

## 2023-04-26 PROCEDURE — 76060000035 HC ANESTHESIA 2 TO 2.5 HR: Performed by: INTERNAL MEDICINE

## 2023-04-26 PROCEDURE — 77030008684 HC TU ET CUF COVD -B: Performed by: ANESTHESIOLOGY

## 2023-04-26 PROCEDURE — C1759 CATH, INTRA ECHOCARDIOGRAPHY: HCPCS | Performed by: INTERNAL MEDICINE

## 2023-04-26 PROCEDURE — C1732 CATH, EP, DIAG/ABL, 3D/VECT: HCPCS | Performed by: INTERNAL MEDICINE

## 2023-04-26 PROCEDURE — 36415 COLL VENOUS BLD VENIPUNCTURE: CPT

## 2023-04-26 PROCEDURE — 93623 PRGRMD STIMJ&PACG IV RX NFS: CPT | Performed by: INTERNAL MEDICINE

## 2023-04-26 PROCEDURE — 77030018729 HC ELECTRD DEFIB PAD CARD -B: Performed by: INTERNAL MEDICINE

## 2023-04-26 PROCEDURE — C1766 INTRO/SHEATH,STRBLE,NON-PEEL: HCPCS | Performed by: INTERNAL MEDICINE

## 2023-04-26 RX ORDER — LIDOCAINE HYDROCHLORIDE 10 MG/ML
0.1 INJECTION, SOLUTION EPIDURAL; INFILTRATION; INTRACAUDAL; PERINEURAL AS NEEDED
Status: CANCELLED | OUTPATIENT
Start: 2023-04-26

## 2023-04-26 RX ORDER — PROPOFOL 10 MG/ML
INJECTION, EMULSION INTRAVENOUS AS NEEDED
Status: DISCONTINUED | OUTPATIENT
Start: 2023-04-26 | End: 2023-04-26 | Stop reason: HOSPADM

## 2023-04-26 RX ORDER — PANTOPRAZOLE SODIUM 40 MG/1
40 TABLET, DELAYED RELEASE ORAL 2 TIMES DAILY
Qty: 60 TABLET | Refills: 0 | Status: SHIPPED | OUTPATIENT
Start: 2023-04-26 | End: 2023-05-26

## 2023-04-26 RX ORDER — FENTANYL CITRATE 50 UG/ML
INJECTION, SOLUTION INTRAMUSCULAR; INTRAVENOUS AS NEEDED
Status: DISCONTINUED | OUTPATIENT
Start: 2023-04-26 | End: 2023-04-26 | Stop reason: HOSPADM

## 2023-04-26 RX ORDER — SODIUM CHLORIDE 0.9 % (FLUSH) 0.9 %
5-40 SYRINGE (ML) INJECTION EVERY 8 HOURS
Status: CANCELLED | OUTPATIENT
Start: 2023-04-26

## 2023-04-26 RX ORDER — ONDANSETRON 2 MG/ML
4 INJECTION INTRAMUSCULAR; INTRAVENOUS AS NEEDED
Status: CANCELLED | OUTPATIENT
Start: 2023-04-26

## 2023-04-26 RX ORDER — SODIUM CHLORIDE 0.9 % (FLUSH) 0.9 %
5-40 SYRINGE (ML) INJECTION AS NEEDED
Status: DISCONTINUED | OUTPATIENT
Start: 2023-04-26 | End: 2023-04-26 | Stop reason: HOSPADM

## 2023-04-26 RX ORDER — SODIUM CHLORIDE 9 MG/ML
50 INJECTION, SOLUTION INTRAVENOUS CONTINUOUS
Status: CANCELLED | OUTPATIENT
Start: 2023-04-26

## 2023-04-26 RX ORDER — HEPARIN SODIUM 10000 [USP'U]/100ML
INJECTION, SOLUTION INTRAVENOUS
Status: DISCONTINUED | OUTPATIENT
Start: 2023-04-26 | End: 2023-04-26 | Stop reason: HOSPADM

## 2023-04-26 RX ORDER — MIDAZOLAM HYDROCHLORIDE 1 MG/ML
1 INJECTION, SOLUTION INTRAMUSCULAR; INTRAVENOUS AS NEEDED
Status: CANCELLED | OUTPATIENT
Start: 2023-04-26

## 2023-04-26 RX ORDER — DIPHENHYDRAMINE HYDROCHLORIDE 50 MG/ML
12.5 INJECTION, SOLUTION INTRAMUSCULAR; INTRAVENOUS AS NEEDED
Status: CANCELLED | OUTPATIENT
Start: 2023-04-26 | End: 2023-04-26

## 2023-04-26 RX ORDER — FENTANYL CITRATE 50 UG/ML
50 INJECTION, SOLUTION INTRAMUSCULAR; INTRAVENOUS AS NEEDED
Status: CANCELLED | OUTPATIENT
Start: 2023-04-26

## 2023-04-26 RX ORDER — HEPARIN SODIUM 1000 [USP'U]/ML
INJECTION, SOLUTION INTRAVENOUS; SUBCUTANEOUS AS NEEDED
Status: DISCONTINUED | OUTPATIENT
Start: 2023-04-26 | End: 2023-04-26 | Stop reason: HOSPADM

## 2023-04-26 RX ORDER — SODIUM CHLORIDE 9 MG/ML
50 INJECTION, SOLUTION INTRAVENOUS CONTINUOUS
Status: CANCELLED | OUTPATIENT
Start: 2023-04-26 | End: 2023-04-27

## 2023-04-26 RX ORDER — ONDANSETRON 2 MG/ML
INJECTION INTRAMUSCULAR; INTRAVENOUS AS NEEDED
Status: DISCONTINUED | OUTPATIENT
Start: 2023-04-26 | End: 2023-04-26 | Stop reason: HOSPADM

## 2023-04-26 RX ORDER — MORPHINE SULFATE 10 MG/ML
2 INJECTION, SOLUTION INTRAMUSCULAR; INTRAVENOUS
Status: CANCELLED | OUTPATIENT
Start: 2023-04-26

## 2023-04-26 RX ORDER — NORETHINDRONE AND ETHINYL ESTRADIOL 0.5-0.035
KIT ORAL AS NEEDED
Status: DISCONTINUED | OUTPATIENT
Start: 2023-04-26 | End: 2023-04-26 | Stop reason: HOSPADM

## 2023-04-26 RX ORDER — MIDAZOLAM HYDROCHLORIDE 1 MG/ML
0.5 INJECTION, SOLUTION INTRAMUSCULAR; INTRAVENOUS
Status: CANCELLED | OUTPATIENT
Start: 2023-04-26

## 2023-04-26 RX ORDER — PROTAMINE SULFATE 10 MG/ML
INJECTION, SOLUTION INTRAVENOUS AS NEEDED
Status: DISCONTINUED | OUTPATIENT
Start: 2023-04-26 | End: 2023-04-26 | Stop reason: HOSPADM

## 2023-04-26 RX ORDER — SODIUM CHLORIDE 0.9 % (FLUSH) 0.9 %
5-40 SYRINGE (ML) INJECTION EVERY 8 HOURS
Status: DISCONTINUED | OUTPATIENT
Start: 2023-04-26 | End: 2023-04-26 | Stop reason: HOSPADM

## 2023-04-26 RX ORDER — SODIUM CHLORIDE 0.9 % (FLUSH) 0.9 %
5-40 SYRINGE (ML) INJECTION AS NEEDED
Status: CANCELLED | OUTPATIENT
Start: 2023-04-26

## 2023-04-26 RX ORDER — HYDROMORPHONE HYDROCHLORIDE 1 MG/ML
0.2 INJECTION, SOLUTION INTRAMUSCULAR; INTRAVENOUS; SUBCUTANEOUS
Status: CANCELLED | OUTPATIENT
Start: 2023-04-26

## 2023-04-26 RX ORDER — FENTANYL CITRATE 50 UG/ML
25 INJECTION, SOLUTION INTRAMUSCULAR; INTRAVENOUS
Status: CANCELLED | OUTPATIENT
Start: 2023-04-26

## 2023-04-26 RX ORDER — ACETAMINOPHEN 325 MG/1
650 TABLET ORAL
Status: DISCONTINUED | OUTPATIENT
Start: 2023-04-26 | End: 2023-04-26 | Stop reason: HOSPADM

## 2023-04-26 RX ORDER — OXYCODONE AND ACETAMINOPHEN 5; 325 MG/1; MG/1
1 TABLET ORAL AS NEEDED
Status: CANCELLED | OUTPATIENT
Start: 2023-04-26

## 2023-04-26 RX ORDER — SODIUM CHLORIDE 9 MG/ML
INJECTION, SOLUTION INTRAVENOUS
Status: DISCONTINUED | OUTPATIENT
Start: 2023-04-26 | End: 2023-04-26 | Stop reason: HOSPADM

## 2023-04-26 RX ORDER — SUCCINYLCHOLINE CHLORIDE 20 MG/ML
INJECTION INTRAMUSCULAR; INTRAVENOUS AS NEEDED
Status: DISCONTINUED | OUTPATIENT
Start: 2023-04-26 | End: 2023-04-26 | Stop reason: HOSPADM

## 2023-04-26 RX ORDER — SODIUM CHLORIDE, SODIUM LACTATE, POTASSIUM CHLORIDE, CALCIUM CHLORIDE 600; 310; 30; 20 MG/100ML; MG/100ML; MG/100ML; MG/100ML
75 INJECTION, SOLUTION INTRAVENOUS CONTINUOUS
Status: CANCELLED | OUTPATIENT
Start: 2023-04-26 | End: 2023-04-27

## 2023-04-26 RX ORDER — GLYCOPYRROLATE 0.2 MG/ML
INJECTION INTRAMUSCULAR; INTRAVENOUS AS NEEDED
Status: DISCONTINUED | OUTPATIENT
Start: 2023-04-26 | End: 2023-04-26 | Stop reason: HOSPADM

## 2023-04-26 RX ORDER — FLECAINIDE ACETATE 50 MG/1
50 TABLET ORAL 2 TIMES DAILY
Qty: 60 TABLET | Refills: 5 | Status: SHIPPED | OUTPATIENT
Start: 2023-04-26

## 2023-04-26 RX ORDER — NALOXONE HYDROCHLORIDE 1 MG/ML
0.4 INJECTION INTRAMUSCULAR; INTRAVENOUS; SUBCUTANEOUS AS NEEDED
Status: DISCONTINUED | OUTPATIENT
Start: 2023-04-26 | End: 2023-04-26 | Stop reason: HOSPADM

## 2023-04-26 RX ORDER — SODIUM CHLORIDE, SODIUM LACTATE, POTASSIUM CHLORIDE, CALCIUM CHLORIDE 600; 310; 30; 20 MG/100ML; MG/100ML; MG/100ML; MG/100ML
75 INJECTION, SOLUTION INTRAVENOUS CONTINUOUS
Status: CANCELLED | OUTPATIENT
Start: 2023-04-26

## 2023-04-26 RX ADMIN — ONDANSETRON HYDROCHLORIDE 4 MG: 2 INJECTION, SOLUTION INTRAMUSCULAR; INTRAVENOUS at 13:13

## 2023-04-26 RX ADMIN — PROTAMINE SULFATE 5 MG: 10 INJECTION, SOLUTION INTRAVENOUS at 13:27

## 2023-04-26 RX ADMIN — PROPOFOL 80 MG: 10 INJECTION, EMULSION INTRAVENOUS at 11:42

## 2023-04-26 RX ADMIN — PROPOFOL 200 MG: 10 INJECTION, EMULSION INTRAVENOUS at 11:41

## 2023-04-26 RX ADMIN — GLYCOPYRROLATE 0.2 MG: 0.2 INJECTION INTRAMUSCULAR; INTRAVENOUS at 11:57

## 2023-04-26 RX ADMIN — EPHEDRINE SULFATE 5 MG: 50 INJECTION INTRAVENOUS at 12:00

## 2023-04-26 RX ADMIN — PHENYLEPHRINE HYDROCHLORIDE 50 MCG/MIN: 10 INJECTION INTRAVENOUS at 11:58

## 2023-04-26 RX ADMIN — HEPARIN SODIUM 12000 UNITS/HR: 10000 INJECTION, SOLUTION INTRAVENOUS at 12:17

## 2023-04-26 RX ADMIN — SUCCINYLCHOLINE CHLORIDE 120 MG: 20 INJECTION, SOLUTION INTRAMUSCULAR; INTRAVENOUS at 11:41

## 2023-04-26 RX ADMIN — ISOPROTERENOL HYDROCHLORIDE 10 MCG/MIN: 0.2 INJECTION, SOLUTION INTRAMUSCULAR; INTRAVENOUS at 13:02

## 2023-04-26 RX ADMIN — FENTANYL CITRATE 50 MCG: 50 INJECTION, SOLUTION INTRAMUSCULAR; INTRAVENOUS at 13:20

## 2023-04-26 RX ADMIN — SODIUM CHLORIDE: 900 INJECTION, SOLUTION INTRAVENOUS at 11:31

## 2023-04-26 RX ADMIN — HEPARIN SODIUM 1000 UNITS: 1000 INJECTION, SOLUTION INTRAVENOUS; SUBCUTANEOUS at 12:37

## 2023-04-26 RX ADMIN — FENTANYL CITRATE 50 MCG: 50 INJECTION, SOLUTION INTRAMUSCULAR; INTRAVENOUS at 12:54

## 2023-04-26 RX ADMIN — EPHEDRINE SULFATE 10 MG: 50 INJECTION INTRAVENOUS at 12:11

## 2023-04-26 RX ADMIN — HEPARIN SODIUM 12000 UNITS: 1000 INJECTION, SOLUTION INTRAVENOUS; SUBCUTANEOUS at 12:18

## 2023-04-26 RX ADMIN — EPHEDRINE SULFATE 5 MG: 50 INJECTION INTRAVENOUS at 11:59

## 2023-04-26 RX ADMIN — PROTAMINE SULFATE 45 MG: 10 INJECTION, SOLUTION INTRAVENOUS at 13:28

## 2023-04-26 NOTE — ANESTHESIA POSTPROCEDURE EVALUATION
Procedure(s):  ABLATION A-FIB  W COMPLETE EP STUDY  ULTRASOUND GUIDED VASCULAR ACCESS  EP 3D MAPPING  INTRACARDIAC ECHOCARDIOGRAM  DRUG STIMULATION  ABLATION FOLLOWING A-FIB  ADDL. general    Anesthesia Post Evaluation      Multimodal analgesia: multimodal analgesia used between 6 hours prior to anesthesia start to PACU discharge  Patient location during evaluation: PACU  Patient participation: complete - patient participated  Level of consciousness: awake and alert  Pain management: adequate  Airway patency: patent  Anesthetic complications: no  Cardiovascular status: acceptable  Respiratory status: acceptable  Hydration status: acceptable  Comments: Seen, no complaints  Post anesthesia nausea and vomiting:  none  Final Post Anesthesia Temperature Assessment:  Normothermia (36.0-37.5 degrees C)      INITIAL Post-op Vital signs:   Vitals Value Taken Time   /94 04/26/23 1455   Temp 36.4 °C (97.6 °F) 04/26/23 1356   Pulse 69 04/26/23 1455   Resp 14 04/26/23 1455   SpO2 98 % 04/26/23 1455   Vitals shown include unvalidated device data.

## 2023-04-26 NOTE — PROGRESS NOTES
Cardiac Cath Lab Recovery Arrival Note:      Marissa Balbuena arrived to Cardiac Cath Lab, Recovery Area. Staff introduced to patient. Patient identifiers verified with NAME and DATE OF BIRTH. Procedure verified with patient. Consent forms reviewed and signed by patient or authorized representative and verified. Allergies verified. Patient and family oriented to department. Patient and family informed of procedure and plan of care. Questions answered with review. Patient prepped for procedure, per orders from physician, prior to arrival.    Patient on cardiac monitor, non-invasive blood pressure, SPO2 monitor. On Room air. Patient is A&Ox 4. Patient reports No Pain. Patient in stretcher, in low position, with side rails up, call bell within reach, patient instructed to call if assistance as needed. Patient prep in: [address], [facility]. Patient family : Wife/ Jose Eduardo Bloomiff in: Waiting Room .    Prep by: Yariel Hays RN

## 2023-04-26 NOTE — PROCEDURES
RADIOFREQUENCY ATRIAL FIBRILLATION ABLATION  TYPICAL ATRIAL FLUTTER ABLATION  SUBSTRATE MODIFICATION ABLATION    Procedure Date: 04/26/23   Lab Physician: Linda Whatley MD    Indications:  57 yo gentleman with a history of symptomatic atrial flutter and atrial fibrillation, intolerant to AAD referred for Afib and AFL ablation. SHEATH INSERTION  All sheaths were placed using the modified Seldinger technique with ultrasound guided assistance  Right Femoral Vein: 8.5Fr Agilis sheath  Left Femoral Vein: 8Fr and a 11F sheath    CATHETER INSERTION  Catheters were advanced to the following positions using fluoroscopic guidance:  Coronary Sinus: : Biosense Bidirectional Decapolar  LA: Biosense OctaRay Mapping catheter  Ablation: Biosense ST/SF D/F ablation catheter  ICE in RA/RV: BiosSyMynd Intracardiac Ultrasound    PROCEDURE NARRATIVE:  EPS and RFA were discussed with the patient in great detail. The risks of bleeding, infection, vascular injury, pulmonary embolus, cardiac perforation, heart block necessitating pacemaker insertion, stroke, MI and death were among those discussed. Alternatives were reviewed including the option of no therapy. All questions were answered. The patient agreed to proceed. Written informed consent was obtained from the patient after a full explanation of the risks and benefits of the procedure. The patient was brought to the lab in the fasting state. Continuous electrocardiographic and hemodynamic monitoring was initiated. The patient was prepped and draped in the usual sterile fashion. General anesthesia with intubation and mechanical conventional ventilation was used. Anesthesia staff performed the intubation and monitoring during the case. Esophageal temperature monitoring was performed with the CIRCA esophageal temperature probe throughout the case.      Vascular Access  Vascular sites were accessed using the percutaneous modified Seldinger technique with ultrasound guidance (ultrasound evaluation of possible access sites. Patency of the selected vessel. Realtime visualization of the vascular needle entry was performed). An 8.5F Agilis right femoral vein sheath was placed. A 8F and a 10F left femoral vein sheaths were placed. Catheters/wires were advanced to the heart under fluoroscopic guidance. A decapolar catheter was advanced to the coronary sinus. An ICE catheter was advanced to the right atrium. A Biosense ST/SF ablation catheter was advanced through the IAS to the left atrium. A Biosense OctaRay catheter was advanced through the IAS to the left atrium. After initiating a heparin bolus and drip to achieve an ACT > 300 sec, single transseptal punctures were performed with ICE and fluoroscopic guidance using 8.5F medium curl Agilis and a Bennett needle. Pulmonary Vein Isolation  The [employer] 3D electroanatomical mapping system was used to define left atrial and pulmonary vein geometry. Vertical alignment of the temperature probe and the ablation catheter was assured using fluoroscopy. Ablation was stopped if the temperature was >38°C. Pacing was performed around the right pulmonary veins to identify areas with phrenic capture prior to ablation. Voltage map of the LA demonstrated normal bipolar voltage. Following the creation of geometry, the ablation catheter was used to perform radiofrequency ablation. The left superior and inferior pulmonary veins were circumferentially encircled as a pair. The right superior and inferior veins were circumferentially encircled as a pair. Pulmonary vein isolation was performed in a circumferential manner around each set of veins until there was no further evidence of atrial activation within the veins. Entry and exit block was demonstrated in 4 of 4 pulmonary veins. Pace testing demonstrated bidirectional block.  The pulmonary veins were considered isolated if there was evidence of bidirectional block demonstrated by the lack of pulmonary vein potentials recorded from the pentaray catheter when placed just inside the pulmonary vein ostia and inability to capture the atria with pacing from each electrode pair when placed just inside the pulmonary vein ostia. Substrate Modification Ablation  Additional substrate modification ablation was performed with 6 additional short ablation lines anchoring to the antral pulmonary vein ablation lesion sets. Three short linear ablation lines were created anchoring from the left pulmonary veins antral lesion set extending anterosuperiorly (1 o'clock), anterolaterally (3 o'clock), and inferiorly (6 o'clock) targeting non-pulmonary vein triggers from ganglionated plexi (GP) and transecting the Ligament of Pedro. Three short linear ablation lines were created anchoring from the right pulmonary veins antral lesion set extending anterosuperiorly (11:30 o'clock), anteroseptal (9 o'clock), and inferiorly (6 o'clock) targeting non-PV triggers from ARGP and IRGP. Atrial Flutter/Cavotricuspid Isthmus Ablation  Using a [employer] STSF ablation catheter with the aid of the Agilis sheath, RF was delivered in the cavotricuspid isthmus, creating a line at approximately 6:30 o'clock on the tricuspid annulus from the annulus to the IVC lip. Following completion of the ablation line, the final medial to lateral transisthmus time was 146 ms and the lateral to medial transisthmus time was 166 ms. Bidirectional block was verified with differential pacing maneuvers. With pacing from the proximal CS and the ablation catheter located more laterally, the medial to lateral time (prox CS to ablation catheter) is shorter. There were wide split potentials along the ablation line. Post Ablation Program Stimulation & Pacing with Drug Infusion  There was no evidence of acute reconnection of the pulmonary veins after >30 minutes of waiting period from the last lesion and on isoproterenol (up to 10 mcg/min).  With burst pacing from the pCS down to 350 ms and with up to single of atrial extrastimuli from the pCS with drive CLs of 610 ms down to AERP on and off of isoproterenol without any inducible arrhythmias.  ms  HV 46 ms  AV krystle Wenckebach cycle length 350 ms  Atrial /220 ms  Av krystle /260 ms  Left ventricular pacing was performed with VERP 600/290 ms    Vascular Access Closure  Post-ablation, ICE demonstrated no evidence of pericardial effusion. All catheters were removed. Protamine was administered to partially correct the ACT to near 200 seconds. All sheaths were removed and hemostasis was adequately achieved at all venotomy sites using Perclose vascular closure device and manual pressure. The patient was extubated, remained hemodynamically stable, tolerated the procedure well and was transferred in good condition to the PACU. Members of the general anesthesia staff and the EP nursing staff provided appropriate monitoring throughout the procedure. The patient tolerated the procedure well and left the laboratory in good condition. Conclusion:  1. Paroxysmal atrial fibrillation s/p successful pulmonary vein isolation in an antral circumferential approach. 2. Additional substrate modification ablation targeting extrapulmonary veins triggers with ganglionated plexi (GP) ablation and endocardial ablation of Ligament of Pedro. 3. Typical Isthmus-dependent atrial flutter s/p successful cavotricuspid isthmus ablation. 4. No acute PVs reconnection noted after a waiting period and with administration of Isuprel. 5. No inducible arrhythmia at the end of case. 6. Normal His-Purkinje system    Plans:  1. Bedrest x 2 hours. 2. Observation to rule out post-procedure complications including CVA, CHF, and bleeding. 3. Continue systemic anticoagulation. 4. Continue home medications  5. PPI BID for 1 month.   6. Outpatient follow up with EP in 1 month

## 2023-04-26 NOTE — DISCHARGE INSTRUCTIONS
Atrial Fibrillation Ablation   Discharge Instructions      You have just had an Atrial Fibrillation Ablation. There were catheters temporarily placed in your heart through a puncture in the veins and/or arteries in your groin. WHAT TO EXPECT     If you have had an Atrial Fibrillation Ablation please be aware that you may experience mild chest pain that will resolve within 24-48 hours. If the chest pain persists or becomes severe, please call the office. Mild to moderate, non-painful, bruising or mild swelling at the puncture site is not un-common, and will resolve in 7 - 14 days, and may extend down your thigh as it heals. Application of Ice to the site may help with any tenderness. You have a small gauze dressing applied to the puncture site in your groin. You may remove this the following morning. It is not uncommon to feel palpitations during the healing phase after your ablation. If you feel as though you are having recurrence of atrial fibrillation lasting longer than 30 minutes, please contact the office. Palpitations/AFIB can occur during the healing phase (1-2 months) post ablation. MEDICATIONS     Take only the medications prescribed to you at discharge. ACTIVITY     A responsible adult must take you home. Do not drive a car for 24 hours. Rest quietly for the remainder of the day. Do not lift anything greater than 10 pounds for 7 days. Limit bending at the puncture site and use of stairs for at least 2 days. You may remove the bandage and shower the morning after the procedure. Do not take a bath for 3 days. SYMPTOMS THAT NEED TO BE REPORTED IMMEDIATELY     Bleeding at the puncture site. If there is bleeding, lie down and hold firm direct pressure for at least 5 minutes. If the bleeding does not stop, go to the closest emergency room, or call 911. Temperature more than 100.5 F. Redness or warmth at the puncture site.   Increasing pain, numbness, coolness or blue discoloration of the extremity where the puncture is located. Pulsating mass at the puncture site. A new lump at the puncture site, or increasing swelling at the site. Please be aware that a pea or marble sized lump is normal, anything larger or increasing in size should be reported. Bruising at the puncture site that enlarges or becomes painful (some bruising at the site is common and will go away in 7-14 days). Dizziness, lightheadedness, fainting. REMEMBER: If you feel something is an emergency or cannot be handled over the phone, call 911 or go to the closest emergency room.       David Hart in 1 month  Beth Henry NP in 3 months        Digna Dutta MD  Cardiac Electrophysiology / Cardiology    [address]  [address] [place] [address], [address], [address]  [phone] / [phone] Fax  [phone] / [phone] Fax

## 2023-04-26 NOTE — H&P
Cardiac Electrophysiology H & P      Primary Cardiologist: Dr Anabella Gambino     Assessment/Plan:   1. Paroxysmal atrial fibrillation (HCC)  -     CBC W/O DIFF; Future  -     METABOLIC PANEL, COMPREHENSIVE; Future  -     MAGNESIUM; Future  2. SVT (supraventricular tachycardia) (HCC)  -     AMB POC EKG ROUTINE W/ 12 LEADS, INTER & REP  -     AMB POC EKG ROUTINE W/ 12 LEADS, INTER & REP  -     CBC W/O DIFF; Future  -     METABOLIC PANEL, COMPREHENSIVE; Future  -     MAGNESIUM; Future  3. Hypomagnesemia  -     AMB POC EKG ROUTINE W/ 12 LEADS, INTER & REP  -     CBC W/O DIFF; Future  -     METABOLIC PANEL, COMPREHENSIVE; Future  -     MAGNESIUM; Future  4. LVH (left ventricular hypertrophy)  -     AMB POC EKG ROUTINE W/ 12 LEADS, INTER & REP  -     CBC W/O DIFF; Future  -     METABOLIC PANEL, COMPREHENSIVE; Future  -     MAGNESIUM; Future     Paroxysmal atrial fibrillation  Mr Posey Soulier is a 59 yo male with symptomatic tachcyardia (svt, af/afl) with resting HR in th 46s. His CHADSVASC is 0. He is a candidate for AF AFL ablation. Significantly symptomatic, increased burden and duration. Limiting his ability to exercise. Baseline bradycardia limits long-term antiarrhythmic therapy. - The implication regarding the diagnosis of AF was explained to the patient in great detail including the associated risk of CVA, AF-mediated cardiomyopathy, and progression into persistent/chronic AF.  - Literature from the EAST-AFNET 4 Trial demonstrated that early rhythm control management in patients with early diagnosis of atrial fibrillation (median time of diagnosis, 36 days) resulted in reduction in cardiovascular mortality, stroke, or hospitalization with worsening heart failure or ACS. - I have discussed options including continued medical therapy and ablation in detail.  I have discussed the risks of left atrial ablation including vascular complications, infection, MI, death, stroke, cardiac tamponade, esophageal fistula, phrenic nerve paralysis, PV stenosis and need for a 2nd procedure with the pt. Patient reports complete understanding of discussions and recommendations and wishes to proceed with the procedure.    -On Xarelto 20 mg daily with meals. He will take [address] 1 mo prior and three months post ablation.    -Pt had S/E with multaq, now on amiodarone for rhythm control as a bridge to ablation   - Will proceed with atrial fibrillation ablation 04/26/23      High risk med management  - We discussed in great detail regarding high risk medication management and the associated risks of antiarrhythmic therapy. Patient reports full understanding of recommendations. - Patient is being monitored for high risk drug monitoring for side effects and toxicity. - EKG at last visit demonstrated QTc of 414 ms    Anticoagulation  The options regarding new oral anticoagulation medications (Xarelto, Pradaxa, Eliquis, Savaysa) vs. Coumadin were explained to the patient in details. The risks and side effects of the medications were explained, including but not limited to the risk of bleeding such as BRBPR, melena, hemoptysis, or hematuria. Patient reports full understanding of recommendations. Subjective:       Juan Woodard is a 58 y.o. patient who is seen for evaluation of  SVT, AF/AFL. He remains symptomatic with elevated HR and asymptomatic bradycardia. Recent event in atrial fib, flutter and SVT. HR around 150-170 bpm.    Unable to exercise due to limiting tachycardia lasting 5-6 hours. Associated sx of weakness with near syncope. Prior to these episodes 4-6 hours a week with activity - lifting, biking, gym. I independently review internal and external records of most recent labs, EKGs, event monitors or Holters, and imaging including most recent echocardiograms and stress test.     Coronary Calcium Score of 4. Lipids improving overall, with LDL goal <100. ASCVD risk 8.1%.      Patient Active Problem List   Diagnosis Code Bilateral hearing loss, unspecified hearing loss type H91.93    Palpitations R00.2         No Known Allergies  Past Medical History:   Diagnosis Date    Hearing loss      No past surgical history on file. Family History   Problem Relation Age of Onset    Heart Disease Maternal Grandmother      Social History     Tobacco Use    Smoking status: Never    Smokeless tobacco: Never   Substance Use Topics    Alcohol use: Not Currently        Review of Systems:   12 point review of systems was performed. All negative except for HPI     Objective:   Ht 6' 1\" (1.854 m)   Wt 200 lb (90.7 kg)   BMI 26.39 kg/m²     Physical Exam:   General:  Alert and oriented, in no acute distress  Head:  Atraumatic, normocephalic  Eyes:  extraocular muscles intact  Neck:  Supple, normal range of motion  Lungs:  Clear to auscultation bilaterally, no wheezes/rales/rhonchi   Cardiovascular:  Regular rate and rhythm, normal S1-S2, no murmurs/rubs/gallops  Abdomen:  Soft, nontender, nondistended, normoactive bowel sounds  Skin:  Intact, no rash  Extremities:, no clubbing, cyanosis, or edema  Musculoskeletal: normal range of motion  Neurological:  Alert and oriented, no focal neurologic deficits  Psychiatric:  Normal mood and affect    Lab Results   Component Value Date/Time    Hemoglobin A1c 5.1 08/01/2022 10:34 AM     EKG: Sinus rhythm, rate 65 bpm, normal axis, QTc 414 ms  10/11/22    ECHO ADULT COMPLETE 10/13/2022 10/13/2022    Interpretation Summary    Left Ventricle: Normal left ventricular systolic function. EF by 2D Simpsons Biplane is 53%. Left ventricle size is normal. Increased wall thickness. Findings consistent with mild concentric hypertrophy. Normal wall motion. Normal diastolic function. Mild LVH , otherwise normal    Signed by: Gabbi Lofton MD on 10/13/2022  1:52 PM        No results found for this or any previous visit.           Thank you for involving me in this patient's care and please call with further concerns or questions. ________________________________________  An Meghan Clayton MD, [facility]  Cardiac Electrophysiology  [facility] [place]  [address] [place] [address], [address] [place] [address]                             [phone]     [address] [place] [address].  [address] [place] [address] [place], [address] [facility]  [phone]

## 2023-04-26 NOTE — PROGRESS NOTES
Cardiac Cath Lab Procedure Area Arrival Note:    Damien Victoria arrived to Cardiac Cath Lab, Procedure Area. Patient identifiers verified with NAME and DATE OF BIRTH. Procedure verified with patient. Consent forms verified. Allergies verified. Patient informed of procedure and plan of care. Questions answered with review. Patient voiced understanding of procedure and plan of care. Patient on cardiac monitor, non-invasive blood pressure, SPO2 monitor. On room air; airway to be managed by CRNA for duration of procedure. IV of normal saline on pump at 25 ml/hr. Patient status doing well without problems. Patient is A&Ox 4. Patient reports no complaints of pain. Patient medicated during procedure with orders obtained and verified by Dr. Gilda Mathew. Refer to patients Cardiac Cath Lab PROCEDURE REPORT for vital signs, assessment, status, and response during procedure, printed at end of case. Printed report on chart or scanned into chart.

## 2023-04-26 NOTE — ANESTHESIA PREPROCEDURE EVALUATION
Relevant Problems   No relevant active problems       Anesthetic History   No history of anesthetic complications            Review of Systems / Medical History  Patient summary reviewed, nursing notes reviewed and pertinent labs reviewed    Pulmonary                   Neuro/Psych             Comments: Hearing loss Cardiovascular            Dysrhythmias : atrial fibrillation      Exercise tolerance: >4 METS     GI/Hepatic/Renal  Within defined limits              Endo/Other  Within defined limits           Other Findings              Physical Exam    Airway  Mallampati: II  TM Distance: > 6 cm  Neck ROM: normal range of motion   Mouth opening: Normal    Comments: Short beard  Cardiovascular  Regular rate and rhythm,  S1 and S2 normal,  no murmur, click, rub, or gallop  Rhythm: regular  Rate: normal         Dental    Dentition: Caps/crowns     Pulmonary  Breath sounds clear to auscultation               Abdominal  GI exam deferred       Other Findings            Anesthetic Plan    ASA: 2  Anesthesia type: general          Induction: Intravenous  Anesthetic plan and risks discussed with: Patient

## 2023-04-26 NOTE — PROGRESS NOTES
1545 Patient up side of bed to walk to bathroom and stress groin area. Left groin area bleed . Pressure applied to area and dressed with Ana Eli . 1615 Patient walked in hallway no bleeding noted. 1630 Patient discharged home via wheelchair with discharge instructions.

## 2023-04-27 LAB
ATRIAL RATE: 67 BPM
CALCULATED P AXIS, ECG09: 66 DEGREES
CALCULATED R AXIS, ECG10: 60 DEGREES
CALCULATED T AXIS, ECG11: 60 DEGREES
DIAGNOSIS, 93000: NORMAL
P-R INTERVAL, ECG05: 180 MS
Q-T INTERVAL, ECG07: 420 MS
QRS DURATION, ECG06: 88 MS
QTC CALCULATION (BEZET), ECG08: 443 MS
VENTRICULAR RATE, ECG03: 67 BPM

## 2023-05-01 ENCOUNTER — CLINICAL SUPPORT (OUTPATIENT)
Dept: CARDIOLOGY CLINIC | Age: 63
End: 2023-05-01
Payer: COMMERCIAL

## 2023-05-01 ENCOUNTER — CLINICAL SUPPORT (OUTPATIENT)
Dept: CARDIOLOGY CLINIC | Age: 63
End: 2023-05-01

## 2023-05-01 VITALS — SYSTOLIC BLOOD PRESSURE: 112 MMHG | HEART RATE: 75 BPM | DIASTOLIC BLOOD PRESSURE: 68 MMHG

## 2023-05-01 DIAGNOSIS — I48.0 PAF (PAROXYSMAL ATRIAL FIBRILLATION) (HCC): Primary | ICD-10-CM

## 2023-05-01 PROCEDURE — 93000 ELECTROCARDIOGRAM COMPLETE: CPT | Performed by: NURSE PRACTITIONER

## 2023-05-01 NOTE — PROGRESS NOTES
Patient presented for ECG only visit, reports frequent palpitations & intermittent low BP. BP WNL, & preliminary review of ECG shows NSR with narrow QRS. 48 hour monitor placed. Dr. Carroll Mouse to review.

## 2023-05-01 NOTE — PROGRESS NOTES
Applied 48 hr holter per Dr Xavi Blum NP/Jason dx: PAF. Pt has #[id number]. Chargeable visit.   Loopd Via

## 2023-05-04 ENCOUNTER — TELEPHONE (OUTPATIENT)
Dept: CARDIOLOGY CLINIC | Age: 63
End: 2023-05-04

## 2023-05-05 RX ORDER — METOPROLOL TARTRATE 25 MG/1
12.5 TABLET, FILM COATED ORAL
Qty: 30 TABLET | Refills: 3 | Status: SHIPPED | OUTPATIENT
Start: 2023-05-05

## 2023-05-05 RX ORDER — FLECAINIDE ACETATE 100 MG/1
100 TABLET ORAL 2 TIMES DAILY
Qty: 60 TABLET | Refills: 3 | Status: SHIPPED | OUTPATIENT
Start: 2023-05-05

## 2023-05-15 RX ORDER — MAGNESIUM OXIDE TAB 400 MG (241.3 MG ELEMENTAL MG) 400 (241.3 MG) MG
TAB ORAL
Qty: 90 TABLET | Refills: 3 | Status: SHIPPED | OUTPATIENT
Start: 2023-05-15

## 2023-05-15 NOTE — TELEPHONE ENCOUNTER
Per VO by NP.     Future Appointments   Date Time Provider [facility] Abigail   5/25/2023  3:20 PM MD ESTRELLA Prince BS AMB   7/10/2023  9:20 AM MD ESTRELLA Yeager BS AMB

## 2023-05-20 RX ORDER — MAGNESIUM OXIDE 400 MG/1
400 TABLET ORAL DAILY
COMMUNITY
Start: 2023-02-09 | End: 2023-05-25

## 2023-05-20 RX ORDER — PANTOPRAZOLE SODIUM 40 MG/1
40 TABLET, DELAYED RELEASE ORAL 2 TIMES DAILY
Qty: 60 TABLET | Refills: 0 | COMMUNITY
Start: 2023-04-26 | End: 2023-05-25 | Stop reason: ALTCHOICE

## 2023-05-20 RX ORDER — FLECAINIDE ACETATE 100 MG/1
100 TABLET ORAL 2 TIMES DAILY
COMMUNITY
Start: 2023-05-05 | End: 2023-05-25

## 2023-05-22 PROBLEM — Z98.890 S/P ABLATION OF ATRIAL FIBRILLATION: Status: ACTIVE | Noted: 2023-05-22

## 2023-05-22 PROBLEM — Z86.79 S/P ABLATION OF ATRIAL FIBRILLATION: Status: ACTIVE | Noted: 2023-05-22

## 2023-05-22 PROBLEM — Z79.899 HIGH RISK MEDICATION USE: Status: ACTIVE | Noted: 2023-05-22

## 2023-05-22 PROBLEM — I48.0 PAF (PAROXYSMAL ATRIAL FIBRILLATION) (HCC): Chronic | Status: ACTIVE | Noted: 2023-05-22

## 2023-05-22 PROBLEM — I48.0 PAF (PAROXYSMAL ATRIAL FIBRILLATION) (HCC): Status: ACTIVE | Noted: 2023-05-22

## 2023-05-22 PROBLEM — Z79.01 ANTICOAGULATED: Status: ACTIVE | Noted: 2023-05-22

## 2023-05-25 ENCOUNTER — OFFICE VISIT (OUTPATIENT)
Age: 63
End: 2023-05-25
Payer: COMMERCIAL

## 2023-05-25 VITALS
DIASTOLIC BLOOD PRESSURE: 78 MMHG | OXYGEN SATURATION: 98 % | SYSTOLIC BLOOD PRESSURE: 110 MMHG | HEIGHT: 73 IN | WEIGHT: 202 LBS | HEART RATE: 67 BPM | BODY MASS INDEX: 26.77 KG/M2 | RESPIRATION RATE: 18 BRPM

## 2023-05-25 DIAGNOSIS — Z98.890 S/P ABLATION OF ATRIAL FIBRILLATION: ICD-10-CM

## 2023-05-25 DIAGNOSIS — Z79.01 ANTICOAGULATED: ICD-10-CM

## 2023-05-25 DIAGNOSIS — I48.0 PAF (PAROXYSMAL ATRIAL FIBRILLATION) (HCC): Primary | ICD-10-CM

## 2023-05-25 DIAGNOSIS — R00.2 PALPITATIONS: ICD-10-CM

## 2023-05-25 DIAGNOSIS — Z86.79 S/P ABLATION OF ATRIAL FIBRILLATION: ICD-10-CM

## 2023-05-25 DIAGNOSIS — Z79.899 HIGH RISK MEDICATION USE: ICD-10-CM

## 2023-05-25 PROCEDURE — 99215 OFFICE O/P EST HI 40 MIN: CPT | Performed by: INTERNAL MEDICINE

## 2023-05-25 PROCEDURE — 93000 ELECTROCARDIOGRAM COMPLETE: CPT | Performed by: INTERNAL MEDICINE

## 2023-05-25 RX ORDER — FLECAINIDE ACETATE 50 MG/1
50 TABLET ORAL 2 TIMES DAILY
Qty: 60 TABLET | Refills: 0
Start: 2023-05-25

## 2023-05-25 ASSESSMENT — PATIENT HEALTH QUESTIONNAIRE - PHQ9
SUM OF ALL RESPONSES TO PHQ9 QUESTIONS 1 & 2: 0
1. LITTLE INTEREST OR PLEASURE IN DOING THINGS: 0
SUM OF ALL RESPONSES TO PHQ QUESTIONS 1-9: 0
2. FEELING DOWN, DEPRESSED OR HOPELESS: 0

## 2023-05-25 NOTE — PROGRESS NOTES
Cardiac Electrophysiology Office Follow-up    NAME: Wilfrido Flores   :  1960  MRM:  [id number]    Date:  2023         Assessment and Plan:     1. PAF (paroxysmal atrial fibrillation) (HCC)  -     EKG 12 Lead  2. Palpitations  3. High risk medication use  4. Anticoagulated  5. S/P ablation of atrial fibrillation           Paroxysmal atrial fibrillation   Mr Eligio Jimenez is a 59 yo male with symptomatic tachcyardia (svt, af/afl) with resting HR in th 46s. His CHADSVASC is 0. He is a candidate for AF AFL ablation. Significantly symptomatic, increased burden and duration. Limiting his ability to exercise. Baseline bradycardia limits long-term antiarrhythmic therapy. - S/p PVI, GP, and CTI ablation on 23 (Katya Jay)  - I'm pleased to hear how well the patient has done post ablation. We spoke in great detail regarding the importance of multidisciplinary management of AFib and the role of risk factors modification in preventing recurrent AF including focusing on diet, weight loss, control of blood pressure, glycemic control, LEIGH diagnosis and treatment, and medication compliance. - Stop Protonix today  - Stop Xarelto on 2023, and start aspirin 81 mg daily  - Decrease flecainide to 50 mg twice daily for 2 weeks then stop  - No longer on metoprolol  - Follow-up with NP in 3 months  - Follow-up with me in 9 months      High risk med management   - We discussed in great detail regarding high risk medication management and the associated risks of antiarrhythmic therapy. Patient reports full understanding of recommendations. - Patient is being monitored for high risk drug monitoring for side effects and toxicity.    - EKG demonstrated NH interval 172 ms, QRS of 100 ms  - We will wean off of flecainide as noted above flecainide 50 twice daily for 2 weeks then stop     Anticoagulation   The options regarding new oral anticoagulation medications (Xarelto, Pradaxa, Eliquis, Savaysa) vs. Coumadin were explained

## 2023-05-25 NOTE — PATIENT INSTRUCTIONS
Stop Protonix today  Stop Xarelto on 7/26/23, then start Aspirin 81 mg daily  Decrease Flecainide to 50 mg twice a day x2 weeks, then stop  FU with NP in 3 months  FU with Dr. Roxie Esparza in 9 months

## 2023-06-14 ENCOUNTER — OFFICE VISIT (OUTPATIENT)
Age: 63
End: 2023-06-14
Payer: COMMERCIAL

## 2023-06-14 VITALS
DIASTOLIC BLOOD PRESSURE: 78 MMHG | WEIGHT: 201.2 LBS | SYSTOLIC BLOOD PRESSURE: 118 MMHG | HEART RATE: 67 BPM | BODY MASS INDEX: 26.66 KG/M2 | OXYGEN SATURATION: 97 % | HEIGHT: 73 IN

## 2023-06-14 DIAGNOSIS — I47.1 SUPRAVENTRICULAR TACHYCARDIA (HCC): ICD-10-CM

## 2023-06-14 DIAGNOSIS — Z79.01 ANTICOAGULATED: ICD-10-CM

## 2023-06-14 DIAGNOSIS — I48.0 PAF (PAROXYSMAL ATRIAL FIBRILLATION) (HCC): Primary | Chronic | ICD-10-CM

## 2023-06-14 DIAGNOSIS — Z71.89 CARDIAC RISK COUNSELING: ICD-10-CM

## 2023-06-14 DIAGNOSIS — Z98.890 S/P ABLATION OF ATRIAL FIBRILLATION: ICD-10-CM

## 2023-06-14 DIAGNOSIS — I48.92 ATRIAL FLUTTER, UNSPECIFIED TYPE (HCC): ICD-10-CM

## 2023-06-14 DIAGNOSIS — Z86.79 S/P ABLATION OF ATRIAL FIBRILLATION: ICD-10-CM

## 2023-06-14 DIAGNOSIS — I45.5 SINUS PAUSE: ICD-10-CM

## 2023-06-14 PROCEDURE — 99214 OFFICE O/P EST MOD 30 MIN: CPT | Performed by: SPECIALIST

## 2023-06-14 ASSESSMENT — PATIENT HEALTH QUESTIONNAIRE - PHQ9
1. LITTLE INTEREST OR PLEASURE IN DOING THINGS: 0
SUM OF ALL RESPONSES TO PHQ QUESTIONS 1-9: 0
SUM OF ALL RESPONSES TO PHQ QUESTIONS 1-9: 0
SUM OF ALL RESPONSES TO PHQ9 QUESTIONS 1 & 2: 0
SUM OF ALL RESPONSES TO PHQ QUESTIONS 1-9: 0
SUM OF ALL RESPONSES TO PHQ QUESTIONS 1-9: 0
2. FEELING DOWN, DEPRESSED OR HOPELESS: 0

## 2023-06-14 NOTE — PROGRESS NOTES
Kierra Michaud     1960       Siobhan Kearney MD, [facility]  Date of Visit-6/14/2023   PCP is DO Merlin Gomez [facility] [place]  [facility]  HPI:  Kierra Michaud is a 58 y.o. male    3-month follow-up  Supraventricular tachycardia. Atrial flutter 3/9/2023   status post atrial fibrillation flutter ablation 4/26/2023  echo 10/11/2022 normal EF  Today. .   seen 3/10/2023 with stress echo planned. Referred to EP and saw Dr. Dr. Marguerite Gaston on 3/28/2023. Started on Xarelto and trialed Multaq as a bridge to ablation. Spoke to patient on 4/20/2023 about monitor   he had his ablation on 4/26/2023. He had an episode on 5-23  It started on flecainide then was seen by EP again on 5/25/2023 with plans to stop Xarelto on 7/26/2023 changed to aspirin and reduce and then stop his flecainide. He returns today feeling much better very pleased with the results of the ablation. He is off the flecainide without any recurrence thus far. He is continue on the Xarelto without any bleeding. He notices a slight dizziness on standing over the past couple months. That is relatively mild. For 4 months he was not exercising but he is gotten back into his regular regimen despite happy with his exercise resumption. He did ask that his calcium score which is 4 and we asked and talked about his LDL cholesterol which is 129. Denies chest pain, edema, syncope or shortness of breath at rest   Has no tachycardia , palpitations or sense of arrythmia        Assessment/Plan:     Antonio Mariano has done well with the ablation and is taking good care of himself with an exercise regimen. He will see Dr. Marguerite Gaston in 9 months and is soon to be out of the blanking period he will continue the Xarelto during that time until he is gotten to 3 months out from the procedure. He is already off the flecainide and going off the flecainide has not felt any new arrhythmia.     We did talk about his calcium score and

## 2023-08-23 ENCOUNTER — OFFICE VISIT (OUTPATIENT)
Age: 63
End: 2023-08-23
Payer: COMMERCIAL

## 2023-08-23 VITALS
WEIGHT: 195.2 LBS | DIASTOLIC BLOOD PRESSURE: 79 MMHG | TEMPERATURE: 97.9 F | OXYGEN SATURATION: 98 % | RESPIRATION RATE: 20 BRPM | SYSTOLIC BLOOD PRESSURE: 120 MMHG | HEART RATE: 64 BPM | BODY MASS INDEX: 25.87 KG/M2 | HEIGHT: 73 IN

## 2023-08-23 DIAGNOSIS — Z98.890 H/O CARDIAC RADIOFREQUENCY ABLATION: ICD-10-CM

## 2023-08-23 DIAGNOSIS — Z00.00 ENCOUNTER FOR GENERAL ADULT MEDICAL EXAMINATION WITHOUT ABNORMAL FINDINGS: Primary | ICD-10-CM

## 2023-08-23 DIAGNOSIS — I48.0 PAROXYSMAL ATRIAL FIBRILLATION (HCC): ICD-10-CM

## 2023-08-23 PROCEDURE — 99396 PREV VISIT EST AGE 40-64: CPT | Performed by: INTERNAL MEDICINE

## 2023-08-23 RX ORDER — ASPIRIN 81 MG/1
81 TABLET ORAL DAILY
COMMUNITY

## 2023-08-23 SDOH — ECONOMIC STABILITY: HOUSING INSECURITY
IN THE LAST 12 MONTHS, WAS THERE A TIME WHEN YOU DID NOT HAVE A STEADY PLACE TO SLEEP OR SLEPT IN A SHELTER (INCLUDING NOW)?: NO

## 2023-08-23 SDOH — ECONOMIC STABILITY: FOOD INSECURITY: WITHIN THE PAST 12 MONTHS, THE FOOD YOU BOUGHT JUST DIDN'T LAST AND YOU DIDN'T HAVE MONEY TO GET MORE.: NEVER TRUE

## 2023-08-23 SDOH — ECONOMIC STABILITY: INCOME INSECURITY: HOW HARD IS IT FOR YOU TO PAY FOR THE VERY BASICS LIKE FOOD, HOUSING, MEDICAL CARE, AND HEATING?: NOT HARD AT ALL

## 2023-08-23 SDOH — ECONOMIC STABILITY: FOOD INSECURITY: WITHIN THE PAST 12 MONTHS, YOU WORRIED THAT YOUR FOOD WOULD RUN OUT BEFORE YOU GOT MONEY TO BUY MORE.: NEVER TRUE

## 2023-08-23 ASSESSMENT — PATIENT HEALTH QUESTIONNAIRE - PHQ9
SUM OF ALL RESPONSES TO PHQ QUESTIONS 1-9: 0
SUM OF ALL RESPONSES TO PHQ QUESTIONS 1-9: 0
1. LITTLE INTEREST OR PLEASURE IN DOING THINGS: 0
2. FEELING DOWN, DEPRESSED OR HOPELESS: 0
SUM OF ALL RESPONSES TO PHQ QUESTIONS 1-9: 0
SUM OF ALL RESPONSES TO PHQ QUESTIONS 1-9: 0
SUM OF ALL RESPONSES TO PHQ9 QUESTIONS 1 & 2: 0

## 2023-08-23 ASSESSMENT — ENCOUNTER SYMPTOMS
ALLERGIC/IMMUNOLOGIC NEGATIVE: 1
EYES NEGATIVE: 1
ABDOMINAL PAIN: 0
RESPIRATORY NEGATIVE: 1
SHORTNESS OF BREATH: 0
GASTROINTESTINAL NEGATIVE: 1

## 2023-08-23 NOTE — PROGRESS NOTES
2023    Ryann Daugherty (:  1960) is a 58 y.o. male, here for a preventive medicine evaluation. Reports having cardiac ablation for paroxysmal A-fib a few months ago. Has done well until a few days ago when he had 5 hours of tachybradycardia symptoms. Supposed to follow-up with cardiologist.  Has been taking off Eliquis. Remains on aspirin daily. Denies any acute problems at this point. Reports cardiac calcium score being low. Has been watching his diet closely and active physically. Has lost a lot of weight. Overall feeling well. Most recent labs reviewed and they look stable. He is due for repeat labs. He is . Independent with all ADLs. Patient Active Problem List   Diagnosis    Encounter for general adult medical examination without abnormal findings    Bilateral hearing loss, unspecified hearing loss type    Palpitations    Paroxysmal atrial fibrillation (HCC)    High risk medication use    Anticoagulated    H/O cardiac radiofrequency ablation       Review of Systems   Constitutional: Negative. HENT: Negative. Eyes: Negative. Respiratory: Negative. Negative for shortness of breath. Cardiovascular:  Positive for palpitations (Occasional). Negative for chest pain and leg swelling. Gastrointestinal: Negative. Negative for abdominal pain. Endocrine: Negative. Genitourinary: Negative. Musculoskeletal:  Positive for arthralgias. Skin: Negative. Allergic/Immunologic: Negative. Neurological: Negative. Hematological: Negative. Psychiatric/Behavioral: Negative. All other systems reviewed and are negative. Prior to Visit Medications    Medication Sig Taking? Authorizing Provider   aspirin 81 MG EC tablet Take 1 tablet by mouth daily Yes Historical Provider, MD        No Known Allergies    Past Medical History:   Diagnosis Date    Hearing loss        No past surgical history on file.     Social History     Socioeconomic History

## 2023-08-24 ENCOUNTER — OFFICE VISIT (OUTPATIENT)
Age: 63
End: 2023-08-24
Payer: COMMERCIAL

## 2023-08-24 VITALS
SYSTOLIC BLOOD PRESSURE: 136 MMHG | HEART RATE: 56 BPM | OXYGEN SATURATION: 98 % | HEIGHT: 73 IN | RESPIRATION RATE: 16 BRPM | DIASTOLIC BLOOD PRESSURE: 96 MMHG | BODY MASS INDEX: 26.24 KG/M2 | WEIGHT: 198 LBS

## 2023-08-24 DIAGNOSIS — I49.3 PVC (PREMATURE VENTRICULAR CONTRACTION): ICD-10-CM

## 2023-08-24 DIAGNOSIS — Z98.890 S/P ABLATION OF ATRIAL FIBRILLATION: ICD-10-CM

## 2023-08-24 DIAGNOSIS — I48.3 TYPICAL ATRIAL FLUTTER (HCC): ICD-10-CM

## 2023-08-24 DIAGNOSIS — I47.1 SUPRAVENTRICULAR TACHYCARDIA (HCC): ICD-10-CM

## 2023-08-24 DIAGNOSIS — I48.0 PAF (PAROXYSMAL ATRIAL FIBRILLATION) (HCC): Primary | ICD-10-CM

## 2023-08-24 DIAGNOSIS — R00.2 PALPITATIONS: ICD-10-CM

## 2023-08-24 DIAGNOSIS — Z86.79 S/P ABLATION OF ATRIAL FIBRILLATION: ICD-10-CM

## 2023-08-24 LAB
25(OH)D3+25(OH)D2 SERPL-MCNC: 29.4 NG/ML (ref 30–100)
ALBUMIN SERPL-MCNC: 4.5 G/DL (ref 3.9–4.9)
ALBUMIN/GLOB SERPL: 2.4 {RATIO} (ref 1.2–2.2)
ALP SERPL-CCNC: 59 IU/L (ref 44–121)
ALT SERPL-CCNC: 18 IU/L (ref 0–44)
AST SERPL-CCNC: 22 IU/L (ref 0–40)
BASOPHILS # BLD AUTO: 0 X10E3/UL (ref 0–0.2)
BASOPHILS NFR BLD AUTO: 0 %
BILIRUB SERPL-MCNC: 0.7 MG/DL (ref 0–1.2)
BUN SERPL-MCNC: 20 MG/DL (ref 8–27)
BUN/CREAT SERPL: 25 (ref 10–24)
CALCIUM SERPL-MCNC: 8.9 MG/DL (ref 8.6–10.2)
CHLORIDE SERPL-SCNC: 104 MMOL/L (ref 96–106)
CHOLEST SERPL-MCNC: 177 MG/DL (ref 100–199)
CO2 SERPL-SCNC: 23 MMOL/L (ref 20–29)
CREAT SERPL-MCNC: 0.79 MG/DL (ref 0.76–1.27)
EGFRCR SERPLBLD CKD-EPI 2021: 99 ML/MIN/1.73
EOSINOPHIL # BLD AUTO: 0.3 X10E3/UL (ref 0–0.4)
EOSINOPHIL NFR BLD AUTO: 4 %
ERYTHROCYTE [DISTWIDTH] IN BLOOD BY AUTOMATED COUNT: 12.6 % (ref 11.6–15.4)
GLOBULIN SER CALC-MCNC: 1.9 G/DL (ref 1.5–4.5)
GLUCOSE SERPL-MCNC: 88 MG/DL (ref 70–99)
HBA1C MFR BLD: 5.1 % (ref 4.8–5.6)
HCT VFR BLD AUTO: 44.2 % (ref 37.5–51)
HDLC SERPL-MCNC: 46 MG/DL
HGB BLD-MCNC: 15.3 G/DL (ref 13–17.7)
IMM GRANULOCYTES # BLD AUTO: 0 X10E3/UL (ref 0–0.1)
IMM GRANULOCYTES NFR BLD AUTO: 0 %
IMP & REVIEW OF LAB RESULTS: NORMAL
LDLC SERPL CALC-MCNC: 115 MG/DL (ref 0–99)
LYMPHOCYTES # BLD AUTO: 1.9 X10E3/UL (ref 0.7–3.1)
LYMPHOCYTES NFR BLD AUTO: 26 %
MAGNESIUM SERPL-MCNC: 2 MG/DL (ref 1.6–2.3)
MCH RBC QN AUTO: 31 PG (ref 26.6–33)
MCHC RBC AUTO-ENTMCNC: 34.6 G/DL (ref 31.5–35.7)
MCV RBC AUTO: 90 FL (ref 79–97)
MONOCYTES # BLD AUTO: 0.9 X10E3/UL (ref 0.1–0.9)
MONOCYTES NFR BLD AUTO: 12 %
NEUTROPHILS # BLD AUTO: 4.2 X10E3/UL (ref 1.4–7)
NEUTROPHILS NFR BLD AUTO: 58 %
PLATELET # BLD AUTO: 193 X10E3/UL (ref 150–450)
POTASSIUM SERPL-SCNC: 4.6 MMOL/L (ref 3.5–5.2)
PROT SERPL-MCNC: 6.4 G/DL (ref 6–8.5)
PSA SERPL-MCNC: 2.9 NG/ML (ref 0–4)
RBC # BLD AUTO: 4.94 X10E6/UL (ref 4.14–5.8)
SODIUM SERPL-SCNC: 142 MMOL/L (ref 134–144)
TRIGL SERPL-MCNC: 88 MG/DL (ref 0–149)
TSH SERPL DL<=0.005 MIU/L-ACNC: 1.48 UIU/ML (ref 0.45–4.5)
VLDLC SERPL CALC-MCNC: 16 MG/DL (ref 5–40)
WBC # BLD AUTO: 7.4 X10E3/UL (ref 3.4–10.8)

## 2023-08-24 PROCEDURE — 93000 ELECTROCARDIOGRAM COMPLETE: CPT | Performed by: NURSE PRACTITIONER

## 2023-08-24 PROCEDURE — 99214 OFFICE O/P EST MOD 30 MIN: CPT | Performed by: NURSE PRACTITIONER

## 2023-08-24 RX ORDER — FLECAINIDE ACETATE 100 MG/1
100 TABLET ORAL DAILY PRN
Qty: 30 TABLET | Refills: 3 | Status: SHIPPED | OUTPATIENT
Start: 2023-08-24

## 2023-08-24 ASSESSMENT — PATIENT HEALTH QUESTIONNAIRE - PHQ9
SUM OF ALL RESPONSES TO PHQ QUESTIONS 1-9: 0
SUM OF ALL RESPONSES TO PHQ QUESTIONS 1-9: 0
1. LITTLE INTEREST OR PLEASURE IN DOING THINGS: 0
2. FEELING DOWN, DEPRESSED OR HOPELESS: 0
SUM OF ALL RESPONSES TO PHQ QUESTIONS 1-9: 0
SUM OF ALL RESPONSES TO PHQ9 QUESTIONS 1 & 2: 0
SUM OF ALL RESPONSES TO PHQ QUESTIONS 1-9: 0

## 2023-08-25 ENCOUNTER — TELEPHONE (OUTPATIENT)
Age: 63
End: 2023-08-25

## 2023-08-25 NOTE — TELEPHONE ENCOUNTER
Enrolled with [employer] - Ordered and being shipped to patient's home address on file. ETA within 5-7 business days. Message  Received: MAC Stacy Ra; Candie Luis  Please order 30 day loop for AFIB. Thanks!

## 2023-08-27 LAB — MAGNESIUM RBC-MCNC: 5 MG/DL (ref 4.2–6.8)

## 2023-09-22 PROBLEM — Z00.00 ENCOUNTER FOR GENERAL ADULT MEDICAL EXAMINATION WITHOUT ABNORMAL FINDINGS: Status: RESOLVED | Noted: 2022-07-29 | Resolved: 2023-09-22

## 2023-10-24 ENCOUNTER — OFFICE VISIT (OUTPATIENT)
Age: 63
End: 2023-10-24
Payer: COMMERCIAL

## 2023-10-24 ENCOUNTER — TELEPHONE (OUTPATIENT)
Age: 63
End: 2023-10-24

## 2023-10-24 VITALS
SYSTOLIC BLOOD PRESSURE: 118 MMHG | DIASTOLIC BLOOD PRESSURE: 78 MMHG | OXYGEN SATURATION: 99 % | BODY MASS INDEX: 26.56 KG/M2 | WEIGHT: 200.4 LBS | HEART RATE: 65 BPM | HEIGHT: 73 IN

## 2023-10-24 DIAGNOSIS — I48.0 PAROXYSMAL ATRIAL FIBRILLATION (HCC): Primary | ICD-10-CM

## 2023-10-24 DIAGNOSIS — Z79.899 HIGH RISK MEDICATION USE: ICD-10-CM

## 2023-10-24 DIAGNOSIS — Z79.01 ANTICOAGULATED: ICD-10-CM

## 2023-10-24 DIAGNOSIS — Z98.890 H/O CARDIAC RADIOFREQUENCY ABLATION: ICD-10-CM

## 2023-10-24 PROCEDURE — 99214 OFFICE O/P EST MOD 30 MIN: CPT | Performed by: INTERNAL MEDICINE

## 2023-10-24 ASSESSMENT — PATIENT HEALTH QUESTIONNAIRE - PHQ9
1. LITTLE INTEREST OR PLEASURE IN DOING THINGS: 0
2. FEELING DOWN, DEPRESSED OR HOPELESS: 0
SUM OF ALL RESPONSES TO PHQ QUESTIONS 1-9: 0
SUM OF ALL RESPONSES TO PHQ9 QUESTIONS 1 & 2: 0
SUM OF ALL RESPONSES TO PHQ QUESTIONS 1-9: 0

## 2023-10-24 NOTE — PROGRESS NOTES
Value Date/Time    BUN 20 08/23/2023 10:10 AM     Lab Results   Component Value Date/Time    K 4.6 08/23/2023 10:10 AM     No results found for: \"HBA1C\"  Lab Results   Component Value Date/Time    HGB 15.3 08/23/2023 10:10 AM     Lab Results   Component Value Date/Time     08/23/2023 10:10 AM     No results for input(s): \"CPK\", \"CKMB\" in the last 72 hours. Invalid input(s): \"CKQMB\", \"CPKMB\", \"TROIQ\"             ___________________________________________________    An Kem Stark MD, [facility], [address] [facility] [place]  [address] [place], [address]                             [phone]     [facility].  [address] [facility] [place], [facility]  [phone]

## 2023-10-24 NOTE — PATIENT INSTRUCTIONS
IF you have sustained episodes of palpitations or AF with HR >110 bpm, take Metoprolol tartrate 25 mg and Flecainide 50 mg  Obtain 2 week monitor extended Holter with Lizabeth  FU with EP clinic in 3 months

## 2024-01-18 ENCOUNTER — OFFICE VISIT (OUTPATIENT)
Age: 64
End: 2024-01-18
Payer: COMMERCIAL

## 2024-01-18 VITALS
HEART RATE: 69 BPM | OXYGEN SATURATION: 95 % | WEIGHT: 211 LBS | DIASTOLIC BLOOD PRESSURE: 82 MMHG | BODY MASS INDEX: 27.96 KG/M2 | HEIGHT: 73 IN | SYSTOLIC BLOOD PRESSURE: 126 MMHG

## 2024-01-18 DIAGNOSIS — Z79.899 HIGH RISK MEDICATION USE: ICD-10-CM

## 2024-01-18 DIAGNOSIS — R00.2 PALPITATIONS: ICD-10-CM

## 2024-01-18 DIAGNOSIS — I48.0 PAROXYSMAL ATRIAL FIBRILLATION (HCC): Primary | ICD-10-CM

## 2024-01-18 DIAGNOSIS — Z98.890 H/O CARDIAC RADIOFREQUENCY ABLATION: ICD-10-CM

## 2024-01-18 PROCEDURE — 99214 OFFICE O/P EST MOD 30 MIN: CPT | Performed by: INTERNAL MEDICINE

## 2024-01-18 PROCEDURE — 3017F COLORECTAL CA SCREEN DOC REV: CPT | Performed by: INTERNAL MEDICINE

## 2024-01-18 PROCEDURE — G8427 DOCREV CUR MEDS BY ELIG CLIN: HCPCS | Performed by: INTERNAL MEDICINE

## 2024-01-18 PROCEDURE — G8419 CALC BMI OUT NRM PARAM NOF/U: HCPCS | Performed by: INTERNAL MEDICINE

## 2024-01-18 PROCEDURE — 1036F TOBACCO NON-USER: CPT | Performed by: INTERNAL MEDICINE

## 2024-01-18 PROCEDURE — G8484 FLU IMMUNIZE NO ADMIN: HCPCS | Performed by: INTERNAL MEDICINE

## 2024-01-18 PROCEDURE — 93000 ELECTROCARDIOGRAM COMPLETE: CPT | Performed by: INTERNAL MEDICINE

## 2024-01-18 ASSESSMENT — PATIENT HEALTH QUESTIONNAIRE - PHQ9
SUM OF ALL RESPONSES TO PHQ QUESTIONS 1-9: 0
1. LITTLE INTEREST OR PLEASURE IN DOING THINGS: 0
SUM OF ALL RESPONSES TO PHQ QUESTIONS 1-9: 0
2. FEELING DOWN, DEPRESSED OR HOPELESS: 0
SUM OF ALL RESPONSES TO PHQ QUESTIONS 1-9: 0
SUM OF ALL RESPONSES TO PHQ QUESTIONS 1-9: 0
SUM OF ALL RESPONSES TO PHQ9 QUESTIONS 1 & 2: 0

## 2024-01-18 NOTE — PROGRESS NOTES
is being monitored for high risk drug monitoring for side effects and toxicity.  - EKG demonstrated DC interval 180 ms, QRS of 82 ms  - OK to keep flecainide & metoprolol prn.     Anticoagulation  -The options regarding new oral anticoagulation medications (Xarelto, Pradaxa, Eliquis, Savaysa) vs. Coumadin were explained to the patient in details. The risks and side effects of the medications were explained, including but not limited to the risk  of bleeding such as BRBPR, melena, hemoptysis, or hematuria.  Patient reports full understanding of recommendations.  - Stopped Xarelto on 7/26/23.  Continue ASA 81 mg po daily           Subjective:         Jaren Carter is a 63 y.o. patient who is seen for evaluation of  SVT, AF/AFL, is s/p AF RFA, typical AFL RFA, & substrate modification ablation on 04/26/2023, has a history of AF/AFL & SVT.      Extended holter monitor 10/2023 showed no AF. Patient agreed that he did not have any episodes during this period. But about a week later they started up again and he takes metoprolol and flecainide and it resolves within 2 hours.     He stopped flecainide in 06/2023, has had recurrent AF since then lasting up to 5 hours, symptomatic with elevated HR.  He uses an Apple Watch for monitoring.     At baseline, he trends bradycardic. ECG today shows sinus bradycardia 70 bpm,  ms, QRS 82 ms.  He denies any activity tolerance, states he's exercising on a regular basis without difficulty.     Echo in 10/2022 showed LVEF 53% with mild concentric LVH.     BP controlled.     [id number] 0, stopped Xarelto 3 months post ablation & started ASA 81 mg po daily.  Denies bleeding issues.      I independently review internal and external records of most recent labs, EKGs, event monitors or Holters, and imaging including most recent echocardiograms and stress test.       Coronary Calcium Score of 4.    Lipids improving overall, with LDL goal <100.    ASCVD risk 8.1%.     S/p AF RFA, typical

## 2024-01-18 NOTE — PATIENT INSTRUCTIONS
Schedule for loop implantation at [facility].  FU with Dr. Cabello in 3 months post loop implant.    Your Implant Loop Recorder procedure has been scheduled for Monday February 5, 2024 at 3:00, at [facility]    Please report to Admitting Department by 1:30pm. You can eat and drink normally and drive yourself home.    Please have labs drawn today.

## 2024-01-26 ENCOUNTER — TELEPHONE (OUTPATIENT)
Age: 64
End: 2024-01-26

## 2024-01-26 ENCOUNTER — PATIENT MESSAGE (OUTPATIENT)
Age: 64
End: 2024-01-26

## 2024-01-26 DIAGNOSIS — I48.0 PAROXYSMAL ATRIAL FIBRILLATION (HCC): ICD-10-CM

## 2024-01-26 NOTE — TELEPHONE ENCOUNTER
I am reaching out about patient Jaren Carter  58. I received a message from [facility] they are unable to approve his procedure and he will require a [id number].  Please call [phone] and use reference # [id number]. Thank you for your help!!!       Called [facility], ID verified using two patient identifiers.  Spoke with [facility].    Peer to peer scheduled between Mary Craft NP and Dr. Mirza Luis on Wednesday, 24 at 2:00 pm est.

## 2024-01-27 LAB
ANION GAP SERPL CALC-SCNC: 6 MMOL/L (ref 5–15)
BASOPHILS # BLD: 0.1 K/UL (ref 0–0.1)
BASOPHILS NFR BLD: 1 % (ref 0–1)
BUN SERPL-MCNC: 18 MG/DL (ref 6–20)
BUN/CREAT SERPL: 21 (ref 12–20)
CALCIUM SERPL-MCNC: 8.8 MG/DL (ref 8.5–10.1)
CHLORIDE SERPL-SCNC: 109 MMOL/L (ref 97–108)
CO2 SERPL-SCNC: 27 MMOL/L (ref 21–32)
CREAT SERPL-MCNC: 0.86 MG/DL (ref 0.7–1.3)
DIFFERENTIAL METHOD BLD: NORMAL
EOSINOPHIL # BLD: 0.3 K/UL (ref 0–0.4)
EOSINOPHIL NFR BLD: 4 % (ref 0–7)
ERYTHROCYTE [DISTWIDTH] IN BLOOD BY AUTOMATED COUNT: 13 % (ref 11.5–14.5)
GLUCOSE SERPL-MCNC: 147 MG/DL (ref 65–100)
HCT VFR BLD AUTO: 45.4 % (ref 36.6–50.3)
HGB BLD-MCNC: 14.8 G/DL (ref 12.1–17)
IMM GRANULOCYTES # BLD AUTO: 0 K/UL (ref 0–0.04)
IMM GRANULOCYTES NFR BLD AUTO: 0 % (ref 0–0.5)
LYMPHOCYTES # BLD: 2.1 K/UL (ref 0.8–3.5)
LYMPHOCYTES NFR BLD: 29 % (ref 12–49)
MCH RBC QN AUTO: 31 PG (ref 26–34)
MCHC RBC AUTO-ENTMCNC: 32.6 G/DL (ref 30–36.5)
MCV RBC AUTO: 95 FL (ref 80–99)
MONOCYTES # BLD: 0.6 K/UL (ref 0–1)
MONOCYTES NFR BLD: 8 % (ref 5–13)
NEUTS SEG # BLD: 4.4 K/UL (ref 1.8–8)
NEUTS SEG NFR BLD: 58 % (ref 32–75)
NRBC # BLD: 0 K/UL (ref 0–0.01)
NRBC BLD-RTO: 0 PER 100 WBC
PLATELET # BLD AUTO: 206 K/UL (ref 150–400)
PMV BLD AUTO: 11 FL (ref 8.9–12.9)
POTASSIUM SERPL-SCNC: 4.2 MMOL/L (ref 3.5–5.1)
RBC # BLD AUTO: 4.78 M/UL (ref 4.1–5.7)
SODIUM SERPL-SCNC: 142 MMOL/L (ref 136–145)
WBC # BLD AUTO: 7.4 K/UL (ref 4.1–11.1)

## 2024-01-29 ENCOUNTER — PREP FOR PROCEDURE (OUTPATIENT)
Age: 64
End: 2024-01-29

## 2024-01-31 ENCOUNTER — TELEPHONE (OUTPATIENT)
Age: 64
End: 2024-01-31

## 2024-01-31 NOTE — TELEPHONE ENCOUNTER
Mary Craft APRN - NP       Denied. They currently only approve for cryptogenic CVA to eval for AF. They said if we have 3 weeks of external monitoring that shows no AF we can appeal it and it will be decided on a case by case basis. We already have two weeks so he said only one more week would be required. Appeal fax # [phone]     Peer to peer completed.

## 2024-01-31 NOTE — TELEPHONE ENCOUNTER
Called patient, ID verified using two patient identifiers.  Advised Mr. Carter that his ILR procedure was denied by his insurance company.  His insurance company stated that he could wear another monitor for one more week and we could file an appeal.  There is no guarantee that the procedure will then be approved.    Mr. Carter states he prefers to wait at this time.  He states his Afib episodes are very infrequent at this time.  He will plan on following up with Dr. Cabello in April as scheduled.  He will call back if he changes his mind.    Future Appointments   Date Time Provider Department Center   4/23/2024  2:40 PM Meeta Cabello MD [facility] BS AMB   8/28/2024  8:30 AM Bienvenido Keller DO [facility] BS AMB

## 2024-04-23 ENCOUNTER — OFFICE VISIT (OUTPATIENT)
Age: 64
End: 2024-04-23
Payer: COMMERCIAL

## 2024-04-23 VITALS
HEIGHT: 74 IN | SYSTOLIC BLOOD PRESSURE: 120 MMHG | OXYGEN SATURATION: 97 % | WEIGHT: 213 LBS | DIASTOLIC BLOOD PRESSURE: 70 MMHG | BODY MASS INDEX: 27.34 KG/M2 | HEART RATE: 66 BPM

## 2024-04-23 DIAGNOSIS — I48.0 PAROXYSMAL ATRIAL FIBRILLATION (HCC): Primary | ICD-10-CM

## 2024-04-23 DIAGNOSIS — R00.2 PALPITATIONS: ICD-10-CM

## 2024-04-23 DIAGNOSIS — Z98.890 H/O CARDIAC RADIOFREQUENCY ABLATION: ICD-10-CM

## 2024-04-23 DIAGNOSIS — Z79.01 ANTICOAGULATED: ICD-10-CM

## 2024-04-23 DIAGNOSIS — Z79.899 HIGH RISK MEDICATION USE: ICD-10-CM

## 2024-04-23 PROCEDURE — G8427 DOCREV CUR MEDS BY ELIG CLIN: HCPCS | Performed by: INTERNAL MEDICINE

## 2024-04-23 PROCEDURE — 3017F COLORECTAL CA SCREEN DOC REV: CPT | Performed by: INTERNAL MEDICINE

## 2024-04-23 PROCEDURE — 99214 OFFICE O/P EST MOD 30 MIN: CPT | Performed by: INTERNAL MEDICINE

## 2024-04-23 PROCEDURE — 93000 ELECTROCARDIOGRAM COMPLETE: CPT | Performed by: INTERNAL MEDICINE

## 2024-04-23 PROCEDURE — G8419 CALC BMI OUT NRM PARAM NOF/U: HCPCS | Performed by: INTERNAL MEDICINE

## 2024-04-23 PROCEDURE — 1123F ACP DISCUSS/DSCN MKR DOCD: CPT | Performed by: INTERNAL MEDICINE

## 2024-04-23 PROCEDURE — 1036F TOBACCO NON-USER: CPT | Performed by: INTERNAL MEDICINE

## 2024-04-23 ASSESSMENT — PATIENT HEALTH QUESTIONNAIRE - PHQ9
1. LITTLE INTEREST OR PLEASURE IN DOING THINGS: NOT AT ALL
2. FEELING DOWN, DEPRESSED OR HOPELESS: NOT AT ALL
SUM OF ALL RESPONSES TO PHQ9 QUESTIONS 1 & 2: 0
SUM OF ALL RESPONSES TO PHQ QUESTIONS 1-9: 0

## 2024-04-23 NOTE — PROGRESS NOTES
suggestive of SVT.      I independently review internal and external records of most recent labs, EKGs, event monitors or Holters, and imaging including most recent echocardiograms and stress test.    Coronary Calcium Score of 4.    Lipids improving overall, with LDL goal <100.    ASCVD risk 8.1%.     S/p AF RFA, typical AFL RFA, & substrate modification ablation Cabello-04/26/2023).     Coronary Ca score 4 in 11/2022.  ASCVD risk 8.1%.    Review of Systems:   12 point review of systems was performed. All negative except for HPI    Exam:     Physical Exam:  /70 (Site: Left Upper Arm, Position: Sitting, Cuff Size: Large Adult)   Pulse 66   Ht 1.88 m (6' 2\")   Wt 96.6 kg (213 lb)   SpO2 97%   BMI 27.35 kg/m²      PHYSICAL EXAM  General:  Alert and oriented, in no acute distress  Head:  Atraumatic, normocephalic  Eyes:  extraocular muscles intact  Neck:  Supple, normal range of motion  Lungs:  Clear to auscultation bilaterally, no wheezes/rales/rhonchi   Cardiovascular:  Regular rate and rhythm, normal S1-S2, no murmurs/rubs/gallops  Abdomen:  Soft, nontender, nondistended, normoactive bowel sounds  Skin:  Intact, no rash  Extremities:, no clubbing, cyanosis, or edema  Musculoskeletal: normal range of motion  Neurological:  Alert and oriented, no focal neurologic deficits  Psychiatric:  Normal mood and affect    EKG: Sinus rhythm, rate 70 bpm, normal axis and intervals, Qtc 413 ms      Medications:     Current Outpatient Medications   Medication Sig    metoprolol tartrate (LOPRESSOR) 25 MG tablet Take 0.5 tablets by mouth daily as needed (For palpitations) For palpitations lasting > 10 min.    flecainide (TAMBOCOR) 100 MG tablet Take 1 tablet by mouth daily as needed (Palpitations) Take 15-20 minutes after taking metoprolol if needed    aspirin 81 MG EC tablet Take 1 tablet by mouth daily     No current facility-administered medications for this visit.      Diagnostic Data Review:     10/11/22    TRANSTHORACIC

## 2024-04-23 NOTE — PATIENT INSTRUCTIONS
Continue current regimen  If you have recurrent palpitations, please contact us for a repeat monitor (1 month), prior to loop implantation  In 6 months, will obtain 1 month event monitor  FU with NP post monitor  FU with Dr. Cabello in 1 year

## 2024-08-28 ENCOUNTER — OFFICE VISIT (OUTPATIENT)
Age: 64
End: 2024-08-28
Payer: COMMERCIAL

## 2024-08-28 VITALS
TEMPERATURE: 97.8 F | SYSTOLIC BLOOD PRESSURE: 118 MMHG | DIASTOLIC BLOOD PRESSURE: 84 MMHG | HEART RATE: 68 BPM | BODY MASS INDEX: 28.23 KG/M2 | WEIGHT: 213 LBS | HEIGHT: 73 IN | OXYGEN SATURATION: 98 %

## 2024-08-28 DIAGNOSIS — Z00.00 ENCOUNTER FOR GENERAL ADULT MEDICAL EXAMINATION WITHOUT ABNORMAL FINDINGS: Primary | ICD-10-CM

## 2024-08-28 DIAGNOSIS — I48.0 PAROXYSMAL ATRIAL FIBRILLATION (HCC): ICD-10-CM

## 2024-08-28 DIAGNOSIS — Z98.890 H/O CARDIAC RADIOFREQUENCY ABLATION: ICD-10-CM

## 2024-08-28 DIAGNOSIS — Z00.00 ENCOUNTER FOR GENERAL ADULT MEDICAL EXAMINATION WITHOUT ABNORMAL FINDINGS: ICD-10-CM

## 2024-08-28 LAB
ERYTHROCYTE [DISTWIDTH] IN BLOOD BY AUTOMATED COUNT: 12.4 % (ref 11.6–15.4)
HBA1C MFR BLD: 5.2 % (ref 4.8–5.6)
HCT VFR BLD AUTO: 46.3 % (ref 37.5–51)
HGB BLD-MCNC: 15.5 G/DL (ref 13–17.7)
MCH RBC QN AUTO: 30 PG (ref 26.6–33)
MCHC RBC AUTO-ENTMCNC: 33.5 G/DL (ref 31.5–35.7)
MCV RBC AUTO: 90 FL (ref 79–97)
PLATELET # BLD AUTO: 220 X10E3/UL (ref 150–450)
RBC # BLD AUTO: 5.16 X10E6/UL (ref 4.14–5.8)
WBC # BLD AUTO: 7.8 X10E3/UL (ref 3.4–10.8)

## 2024-08-28 PROCEDURE — 99397 PER PM REEVAL EST PAT 65+ YR: CPT | Performed by: INTERNAL MEDICINE

## 2024-08-28 SDOH — ECONOMIC STABILITY: FOOD INSECURITY: WITHIN THE PAST 12 MONTHS, THE FOOD YOU BOUGHT JUST DIDN'T LAST AND YOU DIDN'T HAVE MONEY TO GET MORE.: NEVER TRUE

## 2024-08-28 SDOH — ECONOMIC STABILITY: INCOME INSECURITY: HOW HARD IS IT FOR YOU TO PAY FOR THE VERY BASICS LIKE FOOD, HOUSING, MEDICAL CARE, AND HEATING?: NOT HARD AT ALL

## 2024-08-28 SDOH — ECONOMIC STABILITY: FOOD INSECURITY: WITHIN THE PAST 12 MONTHS, YOU WORRIED THAT YOUR FOOD WOULD RUN OUT BEFORE YOU GOT MONEY TO BUY MORE.: NEVER TRUE

## 2024-08-28 ASSESSMENT — ENCOUNTER SYMPTOMS
ALLERGIC/IMMUNOLOGIC NEGATIVE: 1
RESPIRATORY NEGATIVE: 1
SHORTNESS OF BREATH: 0
GASTROINTESTINAL NEGATIVE: 1
EYES NEGATIVE: 1
ABDOMINAL PAIN: 0

## 2024-08-28 NOTE — PROGRESS NOTES
Chief Complaint   Patient presents with    Annual Exam     \"Have you been to the ER, urgent care clinic since your last visit?  Hospitalized since your last visit?\"    NO    “Have you seen or consulted any other health care providers outside of [facility] since your last visit?”    NO            Click Here for Release of Records Request

## 2024-08-28 NOTE — PROGRESS NOTES
Medication Sig Dispense Refill    metoprolol tartrate (LOPRESSOR) 25 MG tablet Take 0.5 tablets by mouth daily as needed (For palpitations) For palpitations lasting > 10 min. 15 tablet 3    flecainide (TAMBOCOR) 100 MG tablet Take 1 tablet by mouth daily as needed (Palpitations) Take 15-20 minutes after taking metoprolol if needed 30 tablet 3    aspirin 81 MG EC tablet Take 1 tablet by mouth daily       No current facility-administered medications on file prior to visit.       /84 (Site: Left Upper Arm, Position: Sitting, Cuff Size: Medium Adult)   Pulse 68   Temp 97.8 °F (36.6 °C)   Ht 1.854 m (6' 1\")   Wt 96.6 kg (213 lb)   SpO2 98%   BMI 28.10 kg/m²      Review of Systems   Constitutional: Negative.    HENT: Negative.     Eyes: Negative.    Respiratory: Negative.  Negative for shortness of breath.    Cardiovascular:  Positive for palpitations (Occasional). Negative for chest pain and leg swelling.   Gastrointestinal: Negative.  Negative for abdominal pain.   Endocrine: Negative.    Genitourinary: Negative.    Musculoskeletal:  Positive for arthralgias.   Skin: Negative.    Allergic/Immunologic: Negative.    Neurological: Negative.    Hematological: Negative.    Psychiatric/Behavioral: Negative.     All other systems reviewed and are negative.        Objective    Physical Exam  Constitutional:       General: He is not in acute distress.     Appearance: Normal appearance.   HENT:      Head: Normocephalic and atraumatic.      Right Ear: Tympanic membrane normal.      Left Ear: Tympanic membrane normal.      Nose: Nose normal.      Mouth/Throat:      Mouth: Mucous membranes are moist.      Pharynx: Oropharynx is clear.   Eyes:      Conjunctiva/sclera: Conjunctivae normal.      Pupils: Pupils are equal, round, and reactive to light.   Neck:      Vascular: No carotid bruit.   Cardiovascular:      Rate and Rhythm: Normal rate and regular rhythm.      Pulses: Normal pulses.      Heart sounds: Normal heart  8/28/2025        Return in about 1 year (around 8/28/2025) for with Maile Aguilar NP.       Continue with current medical management and plan  lab results and schedule of future lab studies reviewed with patient  reviewed diet, exercise and weight control  reviewed medications and side effects in detail  F/u with other MD's/ providers as scheduled  COVID-19 precautions discussed with pt  An After Visit Summary was printed and given to the patient.      Bienvenido Keller DO

## 2024-08-29 LAB
25(OH)D3+25(OH)D2 SERPL-MCNC: 28.2 NG/ML (ref 30–100)
ALBUMIN SERPL-MCNC: 4.4 G/DL (ref 3.9–4.9)
ALP SERPL-CCNC: 57 IU/L (ref 44–121)
ALT SERPL-CCNC: 18 IU/L (ref 0–44)
AST SERPL-CCNC: 24 IU/L (ref 0–40)
BILIRUB SERPL-MCNC: 0.8 MG/DL (ref 0–1.2)
BUN SERPL-MCNC: 13 MG/DL (ref 8–27)
BUN/CREAT SERPL: 13 (ref 10–24)
CALCIUM SERPL-MCNC: 9.1 MG/DL (ref 8.6–10.2)
CHLORIDE SERPL-SCNC: 104 MMOL/L (ref 96–106)
CHOLEST SERPL-MCNC: 198 MG/DL (ref 100–199)
CO2 SERPL-SCNC: 25 MMOL/L (ref 20–29)
CREAT SERPL-MCNC: 1.04 MG/DL (ref 0.76–1.27)
EGFRCR SERPLBLD CKD-EPI 2021: 80 ML/MIN/1.73
GLOBULIN SER CALC-MCNC: 2.2 G/DL (ref 1.5–4.5)
GLUCOSE SERPL-MCNC: 83 MG/DL (ref 70–99)
HDLC SERPL-MCNC: 39 MG/DL
IMP & REVIEW OF LAB RESULTS: NORMAL
LDLC SERPL CALC-MCNC: 136 MG/DL (ref 0–99)
POTASSIUM SERPL-SCNC: 4.5 MMOL/L (ref 3.5–5.2)
PROT SERPL-MCNC: 6.6 G/DL (ref 6–8.5)
PSA SERPL-MCNC: 3.5 NG/ML (ref 0–4)
SODIUM SERPL-SCNC: 141 MMOL/L (ref 134–144)
TRIGL SERPL-MCNC: 127 MG/DL (ref 0–149)
TSH SERPL DL<=0.005 MIU/L-ACNC: 1.69 UIU/ML (ref 0.45–4.5)
VLDLC SERPL CALC-MCNC: 23 MG/DL (ref 5–40)

## 2024-09-18 ENCOUNTER — TELEPHONE (OUTPATIENT)
Age: 64
End: 2024-09-18

## 2024-09-18 ENCOUNTER — PATIENT MESSAGE (OUTPATIENT)
Age: 64
End: 2024-09-18

## 2024-09-27 PROBLEM — Z00.00 ENCOUNTER FOR GENERAL ADULT MEDICAL EXAMINATION WITHOUT ABNORMAL FINDINGS: Status: RESOLVED | Noted: 2022-07-29 | Resolved: 2024-09-27

## 2024-10-16 NOTE — PROGRESS NOTES
medications for this visit.          JOCELYN Siddiqui - NP  [facility]  [address], [address]  [place] [id number] [phone]      CC:Bienvenido Keller DO

## 2024-10-25 ENCOUNTER — OFFICE VISIT (OUTPATIENT)
Age: 64
End: 2024-10-25
Payer: COMMERCIAL

## 2024-10-25 VITALS
DIASTOLIC BLOOD PRESSURE: 88 MMHG | WEIGHT: 221.6 LBS | SYSTOLIC BLOOD PRESSURE: 136 MMHG | BODY MASS INDEX: 29.37 KG/M2 | OXYGEN SATURATION: 97 % | HEART RATE: 74 BPM | HEIGHT: 73 IN

## 2024-10-25 DIAGNOSIS — Z86.79 S/P ABLATION OF ATRIAL FLUTTER: ICD-10-CM

## 2024-10-25 DIAGNOSIS — Z79.01 ANTICOAGULATED: ICD-10-CM

## 2024-10-25 DIAGNOSIS — Z86.79 S/P ABLATION OF ATRIAL FIBRILLATION: ICD-10-CM

## 2024-10-25 DIAGNOSIS — Z98.890 S/P ABLATION OF ATRIAL FIBRILLATION: ICD-10-CM

## 2024-10-25 DIAGNOSIS — I48.0 PAF (PAROXYSMAL ATRIAL FIBRILLATION) (HCC): Primary | ICD-10-CM

## 2024-10-25 DIAGNOSIS — I48.3 TYPICAL ATRIAL FLUTTER (HCC): ICD-10-CM

## 2024-10-25 DIAGNOSIS — Z98.890 S/P ABLATION OF ATRIAL FLUTTER: ICD-10-CM

## 2024-10-25 PROCEDURE — 1036F TOBACCO NON-USER: CPT | Performed by: NURSE PRACTITIONER

## 2024-10-25 PROCEDURE — G8419 CALC BMI OUT NRM PARAM NOF/U: HCPCS | Performed by: NURSE PRACTITIONER

## 2024-10-25 PROCEDURE — G8484 FLU IMMUNIZE NO ADMIN: HCPCS | Performed by: NURSE PRACTITIONER

## 2024-10-25 PROCEDURE — G8427 DOCREV CUR MEDS BY ELIG CLIN: HCPCS | Performed by: NURSE PRACTITIONER

## 2024-10-25 PROCEDURE — 1123F ACP DISCUSS/DSCN MKR DOCD: CPT | Performed by: NURSE PRACTITIONER

## 2024-10-25 PROCEDURE — 93000 ELECTROCARDIOGRAM COMPLETE: CPT | Performed by: NURSE PRACTITIONER

## 2024-10-25 PROCEDURE — 3017F COLORECTAL CA SCREEN DOC REV: CPT | Performed by: NURSE PRACTITIONER

## 2024-10-25 PROCEDURE — 99214 OFFICE O/P EST MOD 30 MIN: CPT | Performed by: NURSE PRACTITIONER

## 2024-10-25 ASSESSMENT — PATIENT HEALTH QUESTIONNAIRE - PHQ9
SUM OF ALL RESPONSES TO PHQ QUESTIONS 1-9: 0
SUM OF ALL RESPONSES TO PHQ QUESTIONS 1-9: 0
1. LITTLE INTEREST OR PLEASURE IN DOING THINGS: NOT AT ALL
2. FEELING DOWN, DEPRESSED OR HOPELESS: NOT AT ALL
SUM OF ALL RESPONSES TO PHQ QUESTIONS 1-9: 0
SUM OF ALL RESPONSES TO PHQ QUESTIONS 1-9: 0
SUM OF ALL RESPONSES TO PHQ9 QUESTIONS 1 & 2: 0

## 2024-10-25 NOTE — PATIENT INSTRUCTIONS
You may use metoprolol 12.5 mg by mouth twice daily with flecainide 50 mg by mouth twice daily if needed.

## 2024-12-13 DIAGNOSIS — I48.0 PAF (PAROXYSMAL ATRIAL FIBRILLATION) (HCC): Primary | ICD-10-CM

## 2024-12-13 RX ORDER — METOPROLOL TARTRATE 25 MG/1
12.5 TABLET, FILM COATED ORAL DAILY PRN
Qty: 15 TABLET | Refills: 3 | Status: SHIPPED | OUTPATIENT
Start: 2024-12-13

## 2024-12-13 RX ORDER — FLECAINIDE ACETATE 100 MG/1
100 TABLET ORAL DAILY PRN
Qty: 30 TABLET | Refills: 3 | Status: SHIPPED | OUTPATIENT
Start: 2024-12-13

## 2025-04-21 NOTE — PROGRESS NOTES
Cardiac Electrophysiology Office Follow-up    NAME: Jaren Carter   :  1960  MRM:  [id number]    Date:  2025         Assessment and Plan:     1. PAF (paroxysmal atrial fibrillation) (HCC)  -     EKG 12 Lead  2. Typical atrial flutter (HCC)  -     EKG 12 Lead  3. S/P ablation of atrial fibrillation  -     EKG 12 Lead  4. S/P ablation of atrial flutter  -     EKG 12 Lead  5. Anticoagulated  -     EKG 12 Lead  6. Paroxysmal atrial fibrillation (HCC)  -     EKG 12 Lead  7. High risk medication use           Paroxysmal atrial fibrillation  Sporadic episodes of palpitations and tachycardia, symptomatic with episodes.     -S/p PVI, GP, and CTI ablation on 23 (Cabello).  -Off flecainide since 2023.  -Stopped Xarelto 3 months post ablation, tolerating ASA 81 mg po daily.  -Reviewed risks/benefits of ongoing pill in the pocket vs repeat ablation.  Baseline bradycardia limits long-term antiarrhythmic therapy.Will take Metoprolol 25 mg and Flecainide 300 mg as needed for sustained palpitations with HR >110 bpm  - Extended holter monitor 10/2023 showed no AF. Patient agreed that he did not have any episodes during this period.   - 30-day monitor from 2024 demonstrated ave HR of 71 bpm, no AF, PACs/PVCs<1%.   - given ongoing episodes of palpitations, symptomatic, with bradycardia at baseline, negative 30 event monitor with episodes sporadic with periods of quiescence for a month at a time and difficult to diagnosed, we spoke regarding the benefit for ILR to assess for AF burden vs. SVT to help guide need for OAC and/or ablation  - implications, benefits, risks of ILR implantation was explained to patient in great details, patient wish to proceed with procedure  - schedule for ILR implantation  - FU with 6 months with me    High risk med management  - We discussed in great detail regarding high risk medication management and the associated risks of antiarrhythmic therapy.  Patient reports full understanding

## 2025-04-22 ENCOUNTER — OFFICE VISIT (OUTPATIENT)
Age: 65
End: 2025-04-22
Payer: COMMERCIAL

## 2025-04-22 ENCOUNTER — TELEPHONE (OUTPATIENT)
Age: 65
End: 2025-04-22

## 2025-04-22 VITALS
DIASTOLIC BLOOD PRESSURE: 74 MMHG | SYSTOLIC BLOOD PRESSURE: 118 MMHG | OXYGEN SATURATION: 95 % | HEART RATE: 70 BPM | BODY MASS INDEX: 35.25 KG/M2 | HEIGHT: 73 IN | WEIGHT: 266 LBS

## 2025-04-22 DIAGNOSIS — Z79.899 HIGH RISK MEDICATION USE: ICD-10-CM

## 2025-04-22 DIAGNOSIS — I48.3 TYPICAL ATRIAL FLUTTER (HCC): ICD-10-CM

## 2025-04-22 DIAGNOSIS — I48.0 PAROXYSMAL ATRIAL FIBRILLATION (HCC): ICD-10-CM

## 2025-04-22 DIAGNOSIS — Z98.890 S/P ABLATION OF ATRIAL FLUTTER: ICD-10-CM

## 2025-04-22 DIAGNOSIS — I48.0 PAF (PAROXYSMAL ATRIAL FIBRILLATION) (HCC): Primary | ICD-10-CM

## 2025-04-22 DIAGNOSIS — Z98.890 S/P ABLATION OF ATRIAL FIBRILLATION: ICD-10-CM

## 2025-04-22 DIAGNOSIS — Z86.79 S/P ABLATION OF ATRIAL FIBRILLATION: ICD-10-CM

## 2025-04-22 DIAGNOSIS — Z79.01 ANTICOAGULATED: ICD-10-CM

## 2025-04-22 DIAGNOSIS — Z86.79 S/P ABLATION OF ATRIAL FLUTTER: ICD-10-CM

## 2025-04-22 PROCEDURE — G8417 CALC BMI ABV UP PARAM F/U: HCPCS | Performed by: INTERNAL MEDICINE

## 2025-04-22 PROCEDURE — G8427 DOCREV CUR MEDS BY ELIG CLIN: HCPCS | Performed by: INTERNAL MEDICINE

## 2025-04-22 PROCEDURE — 1123F ACP DISCUSS/DSCN MKR DOCD: CPT | Performed by: INTERNAL MEDICINE

## 2025-04-22 PROCEDURE — 99214 OFFICE O/P EST MOD 30 MIN: CPT | Performed by: INTERNAL MEDICINE

## 2025-04-22 PROCEDURE — 93000 ELECTROCARDIOGRAM COMPLETE: CPT | Performed by: INTERNAL MEDICINE

## 2025-04-22 PROCEDURE — G2211 COMPLEX E/M VISIT ADD ON: HCPCS | Performed by: INTERNAL MEDICINE

## 2025-04-22 PROCEDURE — 3017F COLORECTAL CA SCREEN DOC REV: CPT | Performed by: INTERNAL MEDICINE

## 2025-04-22 PROCEDURE — 1036F TOBACCO NON-USER: CPT | Performed by: INTERNAL MEDICINE

## 2025-04-22 ASSESSMENT — PATIENT HEALTH QUESTIONNAIRE - PHQ9
SUM OF ALL RESPONSES TO PHQ QUESTIONS 1-9: 0
2. FEELING DOWN, DEPRESSED OR HOPELESS: NOT AT ALL
SUM OF ALL RESPONSES TO PHQ QUESTIONS 1-9: 0
1. LITTLE INTEREST OR PLEASURE IN DOING THINGS: NOT AT ALL
SUM OF ALL RESPONSES TO PHQ QUESTIONS 1-9: 0
SUM OF ALL RESPONSES TO PHQ QUESTIONS 1-9: 0

## 2025-04-22 NOTE — TELEPHONE ENCOUNTER
Spoke to Pt schedule ILR on 6/23/25 at 1130am arrive at 10am. Check in at the 2nd Floor Outpt Reg. Desk [facility].

## 2025-04-22 NOTE — PATIENT INSTRUCTIONS
implanted device, and a transmission will be sent prior to discharge. No further action is required.   If you choose to use the plug-in monitor for remote monitoring: Before bed, plug in the monitor within 10ft of where you sleep. The box will automatically connect and pair with your implanted device overnight. No further action is required unless otherwise instructed by the clinic.   The monitor is designed to send data to the CareLink website that is accessed by our clinic. Alerts transmit daily; Summary reports transmit every 31 days. If we see anything unusual or see that you become disconnected, the clinic will contact you. Otherwise, no news is good news!  You do not need to take your home monitor with you when you travel. If you have a medical emergency while traveling, seek medical attention immediately or call 911.      If there are scheduling issues/concerns, please contact Nicole at [phone] for further assistance.  For clinical questions/concerns, please send a [employer] message for a quicker response from the nurse.     Serjio Cabello MD  Cardiac Electrophysiology / Cardiology      [facility] [place]  [facility]  [address], [address]        [address] [place] [address], [facility] [address]  [address]  [address] [phone] / [phone] Fax       [phone] / [phone] Fax

## 2025-04-22 NOTE — PROGRESS NOTES
1. Have you been to the ER, urgent care clinic since your last visit?  Hospitalized since your last visit?  No    2. Have you seen or consulted any other health care providers outside of the [facility] since your last visit?  Include any pap smears or colon screening.   No

## 2025-06-18 ENCOUNTER — PREP FOR PROCEDURE (OUTPATIENT)
Age: 65
End: 2025-06-18

## 2025-06-18 ENCOUNTER — TELEPHONE (OUTPATIENT)
Age: 65
End: 2025-06-18

## 2025-06-18 RX ORDER — SODIUM CHLORIDE 0.9 % (FLUSH) 0.9 %
5-40 SYRINGE (ML) INJECTION EVERY 12 HOURS SCHEDULED
Status: CANCELLED | OUTPATIENT
Start: 2025-06-18

## 2025-06-18 RX ORDER — SODIUM CHLORIDE 9 MG/ML
INJECTION, SOLUTION INTRAVENOUS PRN
Status: CANCELLED | OUTPATIENT
Start: 2025-06-18

## 2025-06-18 RX ORDER — SODIUM CHLORIDE 0.9 % (FLUSH) 0.9 %
5-40 SYRINGE (ML) INJECTION PRN
Status: CANCELLED | OUTPATIENT
Start: 2025-06-18

## 2025-06-18 NOTE — TELEPHONE ENCOUNTER
Verified patient with two types of identifiers. Patient is scheduled for an ILR with Dr. Cabello on 6/23/25. Notified patient that his procedure time has been adjusted slightly to 11:00 am and he will need to arrive to [facility] by 10:00 am. Reviewed all other procedure instructions.     Patient states he received a message via [employer] that his expected out of pocket expense would be over $3000. Patient states he did not expect the procedure to be this expensive. Advised patient to call his insurance company to discuss a more detailed estimate of his out of pocket expense. I will also see if there is someone he can call within [place] to discuss further. Patient verbalized understanding and will call with any other questions.

## 2025-06-19 NOTE — TELEPHONE ENCOUNTER
Verified patient with two types of identifiers. Notified patient I spoke with our clinical practice supervisor and she will have someone from [facility] call patient to discuss expected out of pocket expenses for ILR on 6/23/5. Patient very appreciative for return call. Patient verbalized understanding and will call with any other questions.      Future Appointments   Date Time Provider Department Center   9/3/2025 10:20 AM Maile Aguilar APRN - CNP [facility]   12/22/2025  8:20 AM Lio Dahl APRN - NP [facility]

## 2025-06-23 ENCOUNTER — HOSPITAL ENCOUNTER (OUTPATIENT)
Facility: HOSPITAL | Age: 65
Setting detail: OUTPATIENT SURGERY
Discharge: HOME | End: 2025-06-23
Attending: INTERNAL MEDICINE | Admitting: INTERNAL MEDICINE
Payer: COMMERCIAL

## 2025-06-23 VITALS — HEIGHT: 73 IN | BODY MASS INDEX: 30.2 KG/M2 | WEIGHT: 227.9 LBS

## 2025-06-23 DIAGNOSIS — I48.0 PAROXYSMAL ATRIAL FIBRILLATION (HCC): ICD-10-CM

## 2025-06-23 LAB — ECHO BSA: 2.31 M2

## 2025-06-23 PROCEDURE — 33285 INSJ SUBQ CAR RHYTHM MNTR: CPT | Performed by: INTERNAL MEDICINE

## 2025-06-23 PROCEDURE — C1764 EVENT RECORDER, CARDIAC: HCPCS | Performed by: INTERNAL MEDICINE

## 2025-06-23 PROCEDURE — 6360000002 HC RX W HCPCS: Performed by: INTERNAL MEDICINE

## 2025-06-23 DEVICE — ICM LNQ22 LINQ II PRIME US
Type: IMPLANTABLE DEVICE | Status: FUNCTIONAL
Brand: LINQ II™

## 2025-06-23 RX ORDER — SODIUM CHLORIDE 0.9 % (FLUSH) 0.9 %
5-40 SYRINGE (ML) INJECTION PRN
Status: DISCONTINUED | OUTPATIENT
Start: 2025-06-23 | End: 2025-06-23 | Stop reason: HOSPADM

## 2025-06-23 RX ORDER — SODIUM CHLORIDE 9 MG/ML
INJECTION, SOLUTION INTRAVENOUS PRN
Status: DISCONTINUED | OUTPATIENT
Start: 2025-06-23 | End: 2025-06-23 | Stop reason: HOSPADM

## 2025-06-23 RX ORDER — SODIUM CHLORIDE 0.9 % (FLUSH) 0.9 %
5-40 SYRINGE (ML) INJECTION EVERY 12 HOURS SCHEDULED
Status: DISCONTINUED | OUTPATIENT
Start: 2025-06-23 | End: 2025-06-23 | Stop reason: HOSPADM

## 2025-06-23 RX ORDER — LIDOCAINE HYDROCHLORIDE 10 MG/ML
INJECTION, SOLUTION INFILTRATION; PERINEURAL PRN
Status: DISCONTINUED | OUTPATIENT
Start: 2025-06-23 | End: 2025-06-23 | Stop reason: HOSPADM

## 2025-06-23 NOTE — H&P
Office note reviewed from 25. No interim changes.        Cardiac Electrophysiology Office Follow-up    NAME: Jaren Carter   :  1960  MRM:  [id number]    Date:  2025         Assessment and Plan:     1. PAF (paroxysmal atrial fibrillation) (HCC)  -     EKG 12 Lead  2. Typical atrial flutter (HCC)  -     EKG 12 Lead  3. S/P ablation of atrial fibrillation  -     EKG 12 Lead  4. S/P ablation of atrial flutter  -     EKG 12 Lead  5. Anticoagulated  -     EKG 12 Lead  6. Paroxysmal atrial fibrillation (HCC)  -     EKG 12 Lead  7. High risk medication use           Paroxysmal atrial fibrillation  Sporadic episodes of palpitations and tachycardia, symptomatic with episodes.     -S/p PVI, GP, and CTI ablation on 23 (Cabello).  -Off flecainide since 2023.  -Stopped Xarelto 3 months post ablation, tolerating ASA 81 mg po daily.  -Reviewed risks/benefits of ongoing pill in the pocket vs repeat ablation.  Baseline bradycardia limits long-term antiarrhythmic therapy.Will take Metoprolol 25 mg and Flecainide 300 mg as needed for sustained palpitations with HR >110 bpm  - Extended holter monitor 10/2023 showed no AF. Patient agreed that he did not have any episodes during this period.   - 30-day monitor from 2024 demonstrated ave HR of 71 bpm, no AF, PACs/PVCs<1%.   - given ongoing episodes of palpitations, symptomatic, with bradycardia at baseline, negative 30 event monitor with episodes sporadic with periods of quiescence for a month at a time and difficult to diagnosed, we spoke regarding the benefit for ILR to assess for AF burden vs. SVT to help guide need for OAC and/or ablation  - implications, benefits, risks of ILR implantation was explained to patient in great details, patient wish to proceed with procedure  - schedule for ILR implantation  - FU with 6 months with me    High risk med management  - We discussed in great detail regarding high risk medication management and the associated risks of

## 2025-06-23 NOTE — PROCEDURES
Loop Implant    Procedure Date: 06/23/25  Lab Physician: Meeta Cabello MD    INDICATIONS:  Cryptogenic Stroke    PROCEDURE NARRATIVE  After obtaining informed consent, the central chest was prepped and draped in sterile fashion creating a sterile site just lateral to the left side of the sternum at about the 5th intercostal space. This area was infiltrated with lidocaine with epinephrine for local anesthesia. A 1 cm puncture was made with the provided puncture tool and a track was created with the Reveal insertion tool. The Reveal Linq was deployed subcutaneously and the insertion tool was removed. The skin closed with a single layer of 4-0 Stratafix suture and dermabond was applied on the incision. Gauze and a sterile bio-occlusive dressing was applied over the incision. The procedure was well tolerated and there were no immediate complications.    CONCLUSIONS  Successful ILR insertion

## 2025-06-23 NOTE — DISCHARGE INSTRUCTIONS
NEED TO BE REPORTED IMMEDIATELY        Temperature more than 100.4 F    Redness or warmth at the incision site, or pain for longer than the first 5 days after the implant.    Drainage from the incision site.    Swelling around the incision site.    REMEMBER: If you feel something is an emergency or cannot be handled over the phone, call 911 or go to the closest emergency room.    HOME MONITORING        If you choose to use the CareLink Shakir for remote monitoring, the shakir will be downloaded, paired with your implanted device, and a transmission will be sent prior to discharge. No further action is required.   If you choose to use the plug-in monitor for remote monitoring: Before bed, plug in the monitor within 10ft of where you sleep. The box will automatically connect and pair with your implanted device overnight. No further action is required unless otherwise instructed by the clinic.   The monitor is designed to send data to the CareLink website that is accessed by our clinic. Alerts transmit daily; Summary reports transmit every 31 days. If we see anything unusual or see that you become disconnected, the clinic will contact you. Otherwise, no news is good news!  You do not need to take your home monitor with you when you travel. If you have a medical emergency while traveling, seek medical attention immediately or call 911.    FOLLOW-UP        Follow-up with NICOL seo in 1 year as noted below.    Future Appointments   Date Time Provider Department Center   9/3/2025 10:20 AM Maile Aguilar APRN - CNP [facility]   12/22/2025  8:20 AM Lio Dahl APRN - NP [facility]         Serjio Cabello MD  Cardiac Electrophysiology / Cardiology    [facility] [place]  [facility]  [address], [address]        [address] [place] [address], [facility] [address]  [address]  [address] [phone] / [phone]

## 2025-06-23 NOTE — PROGRESS NOTES
Received patient from waiting area. Armband and allergies verbally confirmed with patient.  Procedure explained and consents signed.    1146: Patient received from [place]. Patient with non adherent and gauze to left chest. Site clean, dry and intact. No hematoma. Patient with no complaints at this time.     1200: Discharge instructions and prescriptions reviewed with patient. Opportunity provided for questions. Patient verbalized understanding.     1210: Patient ambulated to waiting area. Gait steady. No complaints

## 2025-06-27 ENCOUNTER — OFFICE VISIT (OUTPATIENT)
Age: 65
End: 2025-06-27
Payer: COMMERCIAL

## 2025-06-27 VITALS
HEIGHT: 73 IN | TEMPERATURE: 98 F | HEART RATE: 66 BPM | DIASTOLIC BLOOD PRESSURE: 76 MMHG | SYSTOLIC BLOOD PRESSURE: 110 MMHG | OXYGEN SATURATION: 97 % | BODY MASS INDEX: 29.82 KG/M2 | RESPIRATION RATE: 20 BRPM | WEIGHT: 225 LBS

## 2025-06-27 DIAGNOSIS — R04.0 EPISTAXIS: Primary | ICD-10-CM

## 2025-06-27 PROCEDURE — G8417 CALC BMI ABV UP PARAM F/U: HCPCS | Performed by: CLINICAL NURSE SPECIALIST

## 2025-06-27 PROCEDURE — G8427 DOCREV CUR MEDS BY ELIG CLIN: HCPCS | Performed by: CLINICAL NURSE SPECIALIST

## 2025-06-27 PROCEDURE — 1036F TOBACCO NON-USER: CPT | Performed by: CLINICAL NURSE SPECIALIST

## 2025-06-27 PROCEDURE — 3017F COLORECTAL CA SCREEN DOC REV: CPT | Performed by: CLINICAL NURSE SPECIALIST

## 2025-06-27 PROCEDURE — 99212 OFFICE O/P EST SF 10 MIN: CPT | Performed by: CLINICAL NURSE SPECIALIST

## 2025-06-27 PROCEDURE — 1123F ACP DISCUSS/DSCN MKR DOCD: CPT | Performed by: CLINICAL NURSE SPECIALIST

## 2025-06-27 SDOH — ECONOMIC STABILITY: FOOD INSECURITY: WITHIN THE PAST 12 MONTHS, THE FOOD YOU BOUGHT JUST DIDN'T LAST AND YOU DIDN'T HAVE MONEY TO GET MORE.: NEVER TRUE

## 2025-06-27 SDOH — ECONOMIC STABILITY: FOOD INSECURITY: WITHIN THE PAST 12 MONTHS, YOU WORRIED THAT YOUR FOOD WOULD RUN OUT BEFORE YOU GOT MONEY TO BUY MORE.: NEVER TRUE

## 2025-06-27 ASSESSMENT — ENCOUNTER SYMPTOMS: RESPIRATORY NEGATIVE: 1

## 2025-06-27 NOTE — PROGRESS NOTES
Subjective    Jaren Carter is a 65 y.o. male who presents today for the following:  Chief Complaint   Patient presents with    Epistaxis     Ongoing issue worse when weather is dry          History of Present Illness  The patient presents for evaluation of nosebleeds.    He has had recurrent nosebleeds from his right nostril, though he is not currently having any issues.  He experiences mild congestion upon standing in the morning, leading him to blow his nose. This action often results in a substantial amount of blood, particularly during the winter months. The bleeding is consistently localized to one side. He has not utilized a humidifier at home. He has previously consulted an otolaryngologist, Dr. Ebenezer Malone, for hearing-related issues, but not for this nasal condition.        PMH/PSH/Allergies/Social History/medication list and most recent studies reviewed with patient.     reports that he has never smoked. He has never been exposed to tobacco smoke. He has never used smokeless tobacco.    reports that he does not currently use alcohol.        Past Medical History:   Diagnosis Date    Hearing loss        No Known Allergies     Current Outpatient Medications on File Prior to Visit   Medication Sig Dispense Refill    metoprolol tartrate (LOPRESSOR) 25 MG tablet Take 0.5 tablets by mouth daily as needed (For palpitations) For palpitations lasting > 10 min. 15 tablet 3    flecainide (TAMBOCOR) 100 MG tablet Take 1 tablet by mouth daily as needed (Palpitations) Take 15-20 minutes after taking metoprolol if needed 30 tablet 3    aspirin 81 MG EC tablet Take 1 tablet by mouth daily       No current facility-administered medications on file prior to visit.           Review of Systems   Constitutional: Negative.    HENT:  Positive for nosebleeds.    Respiratory: Negative.     Cardiovascular: Negative.    Neurological: Negative.          Objective    Physical Exam  Vitals reviewed.   Constitutional:

## 2025-06-27 NOTE — PROGRESS NOTES
Chief Complaint   Patient presents with    Epistaxis     Ongoing issue worse when weather is dry        Have you been to the ER, urgent care clinic since your last visit?  Hospitalized since your last visit?   NO    Have you seen or consulted any other health care providers outside our system since your last visit?   NO

## 2025-07-09 ENCOUNTER — PATIENT MESSAGE (OUTPATIENT)
Age: 65
End: 2025-07-09

## 2025-07-09 ENCOUNTER — TELEPHONE (OUTPATIENT)
Age: 65
End: 2025-07-09

## 2025-07-09 NOTE — TELEPHONE ENCOUNTER
----- Message from Dr. Meeta Cabello MD sent at 7/9/2025  1:37 PM EDT -----  Regarding: RE: AF management  Yes, please go ahead and stop ASA and start Xarelto 20 mg daily. Thanks.  ----- Message -----  From: Esther Roca RN  Sent: 7/9/2025   9:19 AM EDT  To: Meeta Cabello MD; JOCELYN Alvarez - NP; #  Subject: AF management                                    Loop recorder inserted 6/23 for PAF. Transmission received 7/9 with 3 recorded episodes of AF and AF w RVR lasting up to 1 h r and 12 mins long. Pt currently only on aspirin-no CAD noted. Please advise if ok to discontinue ASA and begin OAC.    Xarelto was stopped 7/26/23 post AF ablation.

## 2025-07-09 NOTE — TELEPHONE ENCOUNTER
Patient called back. He will check the mychart and also message you there too. Thanks     Patient # [phone]

## 2025-07-09 NOTE — TELEPHONE ENCOUNTER
Pt returned call. ID verified using two patient identifiers. Pt updated on remote transmission findings and is in agreement with plan to stop ASA and start Xarelto 20mg per day. Pharmacy confirmed while pt on phone.

## 2025-07-11 ENCOUNTER — TELEPHONE (OUTPATIENT)
Age: 65
End: 2025-07-11

## 2025-07-24 ENCOUNTER — PATIENT MESSAGE (OUTPATIENT)
Age: 65
End: 2025-07-24

## 2025-07-25 NOTE — TELEPHONE ENCOUNTER
Telephone Call to Patient. ID verified using two patient identifiers.  Called Patient to request if he was able to  his medication. LMOVM to return call or send me a my chart message to confirm.

## 2025-09-03 ENCOUNTER — OFFICE VISIT (OUTPATIENT)
Age: 65
End: 2025-09-03
Payer: COMMERCIAL

## 2025-09-03 VITALS
OXYGEN SATURATION: 99 % | HEART RATE: 63 BPM | DIASTOLIC BLOOD PRESSURE: 72 MMHG | WEIGHT: 221 LBS | RESPIRATION RATE: 16 BRPM | BODY MASS INDEX: 29.29 KG/M2 | HEIGHT: 73 IN | SYSTOLIC BLOOD PRESSURE: 108 MMHG | TEMPERATURE: 97.9 F

## 2025-09-03 DIAGNOSIS — R06.83 SNORING: ICD-10-CM

## 2025-09-03 DIAGNOSIS — Z12.5 SCREENING FOR MALIGNANT NEOPLASM OF PROSTATE: ICD-10-CM

## 2025-09-03 DIAGNOSIS — Z00.00 ENCOUNTER FOR WELL ADULT EXAM WITHOUT ABNORMAL FINDINGS: Primary | ICD-10-CM

## 2025-09-03 PROCEDURE — 99397 PER PM REEVAL EST PAT 65+ YR: CPT | Performed by: CLINICAL NURSE SPECIALIST

## 2025-09-03 ASSESSMENT — ENCOUNTER SYMPTOMS: RESPIRATORY NEGATIVE: 1

## (undated) DEVICE — PAD GROUNDING ADLT ADH FOIL 9FT CORD UNIV

## (undated) DEVICE — CATHETER MAP D CRV 3-3-3-3-3 MM SPC GALAXY OCTARAY

## (undated) DEVICE — ROYAL SILK SURGICAL GOWN, XXL: Brand: CONVERTORS

## (undated) DEVICE — CABLE CATH L2.7M CONNECTS TO CARTO 3 SYS PIU FOR LASSO ECO

## (undated) DEVICE — CATHETER US 8FR L90CM GRN TIP OVERLAY FOR GE-VIVID I VIVID

## (undated) DEVICE — MEDI-TRACE CADENCE ADULT, DEFIBRILLATION ELECTRODE -RTS  (10 PR/PK) - PHYSIO-CONTROL: Brand: MEDI-TRACE CADENCE

## (undated) DEVICE — CABLE CATH L10FT RED PIN CONN 34-34 FOR THERMOCOOL

## (undated) DEVICE — PROVE COVER: Brand: UNBRANDED

## (undated) DEVICE — PINNACLE INTRODUCER SHEATH: Brand: PINNACLE

## (undated) DEVICE — PRESSURE MONITORING SET: Brand: TRUWAVE

## (undated) DEVICE — CATHETER ABLAT 8FR L115CM 1-6-2MM SPC TIP 3.5MM DF CRV

## (undated) DEVICE — COVER CATH ACUNAV ULTRASOUND 5X72IN ANTI STATIC

## (undated) DEVICE — Device: Brand: RFP-100A CONNECTOR CABLE

## (undated) DEVICE — TUBING PMP FOR CARTO SYS SMARTABLATE

## (undated) DEVICE — INTRO SHTH WO/GDWIRE 11FRX23CM -- BRITE TIP - ORDER AS EA

## (undated) DEVICE — REM POLYHESIVE ADULT PATIENT RETURN ELECTRODE: Brand: VALLEYLAB

## (undated) DEVICE — HEART CATH-MRMC: Brand: MEDLINE INDUSTRIES, INC.

## (undated) DEVICE — ELECTRODE,RADIOTRANSLUCENT,FOAM,5PK: Brand: MEDLINE

## (undated) DEVICE — Device: Brand: NRG TRANSSEPTAL NEEDLE

## (undated) DEVICE — PATCH REF EXT FOR CARTO 3 SYS (EA = 6 PACKS)

## (undated) DEVICE — CATHETER EP 7FR L115CM 2-8-2MM SPC TIP 2MM 10 ELECTRD F L

## (undated) DEVICE — INTRO SHTH 8.5F 71MM MED CURL -- STEER AGILIS NXT

## (undated) DEVICE — CABLE CATH L10FT BLU CONN 34-34 PIN ELECTROGRAM CONDUCTION

## (undated) DEVICE — PERCLOSE PROGLIDE™ SUTURE-MEDIATED CLOSURE SYSTEM: Brand: PERCLOSE PROGLIDE™

## (undated) DEVICE — WASTE KIT - ST MARY: Brand: MEDLINE INDUSTRIES, INC.